# Patient Record
Sex: FEMALE | Race: WHITE | NOT HISPANIC OR LATINO | Employment: OTHER | URBAN - METROPOLITAN AREA
[De-identification: names, ages, dates, MRNs, and addresses within clinical notes are randomized per-mention and may not be internally consistent; named-entity substitution may affect disease eponyms.]

---

## 2023-02-27 LAB — THYROTROPIN (MIU/L) IN SER/PLAS BY DETECTION LIMIT <= 0.05 MIU/L: 2.62 MIU/L (ref 0.44–3.98)

## 2023-05-10 ENCOUNTER — OFFICE VISIT (OUTPATIENT)
Dept: PRIMARY CARE | Facility: CLINIC | Age: 68
End: 2023-05-10
Payer: MEDICARE

## 2023-05-10 VITALS
SYSTOLIC BLOOD PRESSURE: 131 MMHG | TEMPERATURE: 97.3 F | RESPIRATION RATE: 18 BRPM | BODY MASS INDEX: 28.63 KG/M2 | HEIGHT: 59 IN | HEART RATE: 68 BPM | DIASTOLIC BLOOD PRESSURE: 83 MMHG | WEIGHT: 142 LBS | OXYGEN SATURATION: 97 %

## 2023-05-10 DIAGNOSIS — J01.10 ACUTE NON-RECURRENT FRONTAL SINUSITIS: Primary | ICD-10-CM

## 2023-05-10 PROCEDURE — 99213 OFFICE O/P EST LOW 20 MIN: CPT | Performed by: NURSE PRACTITIONER

## 2023-05-10 ASSESSMENT — ENCOUNTER SYMPTOMS
SHORTNESS OF BREATH: 0
NAUSEA: 0
SORE THROAT: 0
RHINORRHEA: 1
VOMITING: 0
CHILLS: 0
ABDOMINAL PAIN: 0
SINUS COMPLAINT: 1
COUGH: 0
SINUS PAIN: 0
FEVER: 0
FATIGUE: 0
SINUS PRESSURE: 1
DIARRHEA: 0

## 2023-05-10 NOTE — PROGRESS NOTES
"Subjective   Chief Complaint: Sinus Problem.    Sinus Problem  Associated symptoms include congestion and sinus pressure. Pertinent negatives include no chills, coughing, ear pain, shortness of breath or sore throat.      Maritza Young is a 67 y.o. female who presents for Sinus Problem.  Patient presents with nasal congestion, sinus congestion, rhiorrhea, cheek pain, post nasal drip. Patient denies fever, chills, nausea, vomiting, diarrhea, chest pain, heart palpations, or shortness of breath. Her symptoms started 4/29/2023    OTC has been using dayqul and nyquil     Review of Systems   Constitutional:  Negative for chills, fatigue and fever.   HENT:  Positive for congestion, rhinorrhea and sinus pressure. Negative for ear discharge, ear pain, mouth sores, sinus pain and sore throat.    Respiratory:  Negative for cough and shortness of breath.    Cardiovascular:  Negative for chest pain.   Gastrointestinal:  Negative for abdominal pain, diarrhea, nausea and vomiting.       Objective   /83   Pulse 68   Temp 36.3 °C (97.3 °F)   Resp 18   Ht 1.499 m (4' 11\")   Wt 64.4 kg (142 lb)   SpO2 97%   BMI 28.68 kg/m²   BSA Body surface area is 1.64 meters squared.      Physical Exam  Constitutional:       Appearance: Normal appearance.   Cardiovascular:      Rate and Rhythm: Normal rate and regular rhythm.   Pulmonary:      Effort: Pulmonary effort is normal.      Breath sounds: Normal breath sounds.   Abdominal:      General: Abdomen is flat.      Palpations: Abdomen is soft.   Neurological:      Mental Status: She is alert.       Orders Only on 02/27/2023   Component Date Value Ref Range Status    TSH 02/27/2023 2.62  0.44 - 3.98 mIU/L Final    Comment:  TSH testing is performed using different testing    methodology at [facility] than at other    [facility]. Direct result comparisons should    only be made within the same method.     [facility] Encounter on 09/06/2022   Component Date Value Ref " Range Status    TSH 09/06/2022 2.82  0.44 - 3.98 mIU/L Final    Comment:  TSH testing is performed using different testing    methodology at [facility] than at other    system [facility]. Direct result comparisons should    only be made within the same method.     [facility] Encounter on 05/12/2022   Component Date Value Ref Range Status    TSH 05/12/2022 4.17 (H)  0.44 - 3.98 mIU/L Final    Comment:  TSH testing is performed using different testing    methodology at [facility] than at other    [facility]. Direct result comparisons should    only be made within the same method.       No current outpatient medications on file prior to visit.     No current facility-administered medications on file prior to visit.     No images are attached to the encounter.            Assessment/Plan   Problem List Items Addressed This Visit          Infectious/Inflammatory    Acute non-recurrent frontal sinusitis - Primary     Antibiotic sent to pharmacy  Advised patient to push fluids and start use of cool mist humidifier  Patient to start using flonase nasal spray  Patient to call if develop new or worsening symptoms

## 2023-05-10 NOTE — PATIENT INSTRUCTIONS
Antibiotic sent to pharmacy  Advised patient to push fluids and start use of cool mist humidifier  Patient to start using flonase nasal spray  Patient to call if develop new or worsening symptoms

## 2023-05-21 DIAGNOSIS — E03.9 HYPOTHYROIDISM, UNSPECIFIED: ICD-10-CM

## 2023-05-22 RX ORDER — LEVOTHYROXINE SODIUM 50 UG/1
TABLET ORAL
Qty: 135 TABLET | Refills: 1 | Status: SHIPPED | OUTPATIENT
Start: 2023-05-22 | End: 2023-11-24

## 2023-06-01 DIAGNOSIS — J30.89 ALLERGIC RHINITIS DUE TO OTHER ALLERGIC TRIGGER, UNSPECIFIED SEASONALITY: Primary | ICD-10-CM

## 2023-06-01 RX ORDER — FLUTICASONE PROPIONATE 50 MCG
SPRAY, SUSPENSION (ML) NASAL
Qty: 16 ML | Refills: 3 | Status: SHIPPED | OUTPATIENT
Start: 2023-06-01 | End: 2023-12-01 | Stop reason: ALTCHOICE

## 2023-07-25 ENCOUNTER — OFFICE VISIT (OUTPATIENT)
Dept: PRIMARY CARE | Facility: CLINIC | Age: 68
End: 2023-07-25
Payer: MEDICARE

## 2023-07-25 ENCOUNTER — TELEPHONE (OUTPATIENT)
Dept: PRIMARY CARE | Facility: CLINIC | Age: 68
End: 2023-07-25

## 2023-07-25 VITALS
SYSTOLIC BLOOD PRESSURE: 130 MMHG | OXYGEN SATURATION: 94 % | DIASTOLIC BLOOD PRESSURE: 83 MMHG | BODY MASS INDEX: 28.73 KG/M2 | WEIGHT: 142.25 LBS | HEART RATE: 59 BPM

## 2023-07-25 DIAGNOSIS — E78.1 ESSENTIAL HYPERTRIGLYCERIDEMIA: ICD-10-CM

## 2023-07-25 DIAGNOSIS — M89.8X2 PAIN OF RIGHT HUMERUS: Primary | ICD-10-CM

## 2023-07-25 DIAGNOSIS — E03.9 HYPOTHYROIDISM, UNSPECIFIED TYPE: ICD-10-CM

## 2023-07-25 DIAGNOSIS — M25.512 ACUTE PAIN OF LEFT SHOULDER: ICD-10-CM

## 2023-07-25 PROBLEM — L30.9 ECZEMA: Status: ACTIVE | Noted: 2023-07-25

## 2023-07-25 PROBLEM — D17.30 LIPOMA OF SKIN AND SUBCUTANEOUS TISSUE: Status: ACTIVE | Noted: 2023-07-25

## 2023-07-25 PROBLEM — I45.10 RBBB: Status: ACTIVE | Noted: 2023-07-25

## 2023-07-25 PROBLEM — R22.2 MASS OF CHEST WALL, RIGHT: Status: RESOLVED | Noted: 2018-03-26 | Resolved: 2023-07-25

## 2023-07-25 PROBLEM — R68.82 LIBIDO, DECREASED: Status: ACTIVE | Noted: 2023-07-25

## 2023-07-25 PROBLEM — R91.8 LUNG NODULES: Status: ACTIVE | Noted: 2023-07-25

## 2023-07-25 PROBLEM — R06.09 DYSPNEA ON EXERTION: Status: RESOLVED | Noted: 2023-07-25 | Resolved: 2023-07-25

## 2023-07-25 PROBLEM — R92.8 ABNORMAL MAMMOGRAM: Status: ACTIVE | Noted: 2023-07-25

## 2023-07-25 PROBLEM — B34.9 VIRAL ILLNESS: Status: RESOLVED | Noted: 2023-07-25 | Resolved: 2023-07-25

## 2023-07-25 PROBLEM — R53.83 FATIGUE: Status: ACTIVE | Noted: 2023-07-25

## 2023-07-25 PROBLEM — H10.30 ACUTE CONJUNCTIVITIS: Status: RESOLVED | Noted: 2023-07-25 | Resolved: 2023-07-25

## 2023-07-25 PROCEDURE — 99213 OFFICE O/P EST LOW 20 MIN: CPT | Performed by: NURSE PRACTITIONER

## 2023-07-25 PROCEDURE — 1159F MED LIST DOCD IN RCRD: CPT | Performed by: NURSE PRACTITIONER

## 2023-07-25 PROCEDURE — 1036F TOBACCO NON-USER: CPT | Performed by: NURSE PRACTITIONER

## 2023-07-25 PROCEDURE — 1160F RVW MEDS BY RX/DR IN RCRD: CPT | Performed by: NURSE PRACTITIONER

## 2023-07-25 RX ORDER — VIT A/VIT C/VIT E/ZINC/COPPER 2148-113
1 TABLET ORAL DAILY
COMMUNITY
Start: 2022-07-19

## 2023-07-25 ASSESSMENT — ENCOUNTER SYMPTOMS
DIARRHEA: 0
FEVER: 0
SHORTNESS OF BREATH: 0
ABDOMINAL PAIN: 0
COUGH: 0
NAUSEA: 0
VOMITING: 0
CHILLS: 0

## 2023-07-25 NOTE — PATIENT INSTRUCTIONS
Obtain xray as ordered  We will call you with the results  Recommend ICE 20 minutes twice daily  May use tylenol and/or advil for pain

## 2023-07-25 NOTE — PROGRESS NOTES
Subjective   Chief Complaint: Tendonitis (Since 7/19. Right bicep).    THEA Young is a 67 y.o. female who presents for Tendonitis (Since 7/19. Right bicep).    Patient reports that 6 days ago she was lifting a heavy basket to water and she heard pop in her right arm which caused her to drop the basket onto the ground. She reports that the basket did not fall on her arm but she did develop a right arm bruise. She has been applying ice to the area but continues to experience some ROM issues when moving her hand behind her back.    Patient is also inquiring about a pain in her left shoulder that started a few months ago and has not improved. She has difficulty rasing her left arm all the way above her head due to the pain.   She has no swelling or bruising of her left arm or shoulder. She has not had any xray done of this area.     Patient denies fever, chills, nausea, vomiting, diarrhea, chest pain, heart palpations, or shortness of breath.       Review of Systems   Constitutional:  Negative for chills and fever.   Respiratory:  Negative for cough and shortness of breath.    Cardiovascular:  Negative for chest pain.   Gastrointestinal:  Negative for abdominal pain, diarrhea, nausea and vomiting.   Musculoskeletal:         Left shoulder pain with ROM    Right upper arm pain and bruising        Objective   /83   Pulse 59   Wt 64.5 kg (142 lb 4 oz)   SpO2 94%   BMI 28.73 kg/m²   BSA Body surface area is 1.64 meters squared.      Physical Exam  Constitutional:       Appearance: Normal appearance.   Cardiovascular:      Rate and Rhythm: Normal rate and regular rhythm.   Pulmonary:      Effort: Pulmonary effort is normal.      Breath sounds: Normal breath sounds.   Abdominal:      General: Abdomen is flat.      Palpations: Abdomen is soft.   Musculoskeletal:      Comments: Right upper arm bruising, swelling and tenderness    Left shoulder decreased ROM    Neurological:      Mental Status: She is alert.        Orders Only on 02/27/2023   Component Date Value Ref Range Status    TSH 02/27/2023 2.62  0.44 - 3.98 mIU/L Final    Comment:  TSH testing is performed using different testing    methodology at [facility] than at other    [facility]. Direct result comparisons should    only be made within the same method.     [facility] Encounter on 09/06/2022   Component Date Value Ref Range Status    TSH 09/06/2022 2.82  0.44 - 3.98 mIU/L Final    Comment:  TSH testing is performed using different testing    methodology at [facility] than at other    [facility]. Direct result comparisons should    only be made within the same method.       Current Outpatient Medications on File Prior to Visit   Medication Sig Dispense Refill    calcium citrate-vitamin D2 250 mg-2.5 mcg (100 unit) tablet Take 250 mg by mouth once daily.      flaxseed oiL oil Take 1,000 mg by mouth once daily.      levothyroxine (Synthroid, Levoxyl) 50 mcg tablet TAKE 1 & 1/2 TABLET BY MOUTH EVERY  tablet 1    vitamins A,C,E-zinc-copper (PreserVision AREDS) 2,148 mcg-113 mg-45 mg-17.4mg tablet Take 1 tablet by mouth once daily.      fluticasone (Flonase) 50 mcg/actuation nasal spray USE 2 SPRAYS IN EACH NOSTRIL DAILY (Patient not taking: Reported on 7/25/2023) 16 mL 3     No current facility-administered medications on file prior to visit.     No images are attached to the encounter.            Assessment/Plan   Problem List Items Addressed This Visit       Pain of right humerus - Primary     XR ordered          Relevant Orders    XR humerus right     Other Visit Diagnoses       Acute pain of left shoulder        Relevant Orders    XR shoulder left 2+ views

## 2023-10-10 ENCOUNTER — APPOINTMENT (OUTPATIENT)
Dept: PRIMARY CARE | Facility: CLINIC | Age: 68
End: 2023-10-10
Payer: MEDICARE

## 2023-11-07 ENCOUNTER — APPOINTMENT (OUTPATIENT)
Dept: PRIMARY CARE | Facility: CLINIC | Age: 68
End: 2023-11-07
Payer: MEDICARE

## 2023-11-21 ENCOUNTER — LAB (OUTPATIENT)
Dept: LAB | Facility: LAB | Age: 68
End: 2023-11-21
Payer: MEDICARE

## 2023-11-21 DIAGNOSIS — E03.9 HYPOTHYROIDISM, UNSPECIFIED TYPE: ICD-10-CM

## 2023-11-21 DIAGNOSIS — E78.1 ESSENTIAL HYPERTRIGLYCERIDEMIA: ICD-10-CM

## 2023-11-21 LAB
BASOPHILS # BLD AUTO: 0.03 X10*3/UL (ref 0–0.1)
BASOPHILS NFR BLD AUTO: 0.7 %
EOSINOPHIL # BLD AUTO: 0.1 X10*3/UL (ref 0–0.7)
EOSINOPHIL NFR BLD AUTO: 2.2 %
ERYTHROCYTE [DISTWIDTH] IN BLOOD BY AUTOMATED COUNT: 13 % (ref 11.5–14.5)
HCT VFR BLD AUTO: 42.1 % (ref 36–46)
HGB BLD-MCNC: 14.2 G/DL (ref 12–16)
IMM GRANULOCYTES # BLD AUTO: 0 X10*3/UL (ref 0–0.7)
IMM GRANULOCYTES NFR BLD AUTO: 0 % (ref 0–0.9)
LYMPHOCYTES # BLD AUTO: 1.73 X10*3/UL (ref 1.2–4.8)
LYMPHOCYTES NFR BLD AUTO: 38.1 %
MCH RBC QN AUTO: 30.8 PG (ref 26–34)
MCHC RBC AUTO-ENTMCNC: 33.7 G/DL (ref 32–36)
MCV RBC AUTO: 91 FL (ref 80–100)
MONOCYTES # BLD AUTO: 0.33 X10*3/UL (ref 0.1–1)
MONOCYTES NFR BLD AUTO: 7.3 %
NEUTROPHILS # BLD AUTO: 2.35 X10*3/UL (ref 1.2–7.7)
NEUTROPHILS NFR BLD AUTO: 51.7 %
NRBC BLD-RTO: 0 /100 WBCS (ref 0–0)
PLATELET # BLD AUTO: 201 X10*3/UL (ref 150–450)
RBC # BLD AUTO: 4.61 X10*6/UL (ref 4–5.2)
WBC # BLD AUTO: 4.5 X10*3/UL (ref 4.4–11.3)

## 2023-11-21 PROCEDURE — 84443 ASSAY THYROID STIM HORMONE: CPT

## 2023-11-21 PROCEDURE — 36415 COLL VENOUS BLD VENIPUNCTURE: CPT

## 2023-11-21 PROCEDURE — 80053 COMPREHEN METABOLIC PANEL: CPT

## 2023-11-21 PROCEDURE — 80061 LIPID PANEL: CPT

## 2023-11-21 PROCEDURE — 85025 COMPLETE CBC W/AUTO DIFF WBC: CPT

## 2023-11-22 LAB
ALBUMIN SERPL BCP-MCNC: 4.4 G/DL (ref 3.4–5)
ALP SERPL-CCNC: 79 U/L (ref 33–136)
ALT SERPL W P-5'-P-CCNC: 16 U/L (ref 7–45)
ANION GAP SERPL CALC-SCNC: 11 MMOL/L (ref 10–20)
AST SERPL W P-5'-P-CCNC: 18 U/L (ref 9–39)
BILIRUB SERPL-MCNC: 0.5 MG/DL (ref 0–1.2)
BUN SERPL-MCNC: 19 MG/DL (ref 6–23)
CALCIUM SERPL-MCNC: 9.3 MG/DL (ref 8.6–10.6)
CHLORIDE SERPL-SCNC: 104 MMOL/L (ref 98–107)
CHOLEST SERPL-MCNC: 175 MG/DL (ref 0–199)
CHOLESTEROL/HDL RATIO: 4.2
CO2 SERPL-SCNC: 30 MMOL/L (ref 21–32)
CREAT SERPL-MCNC: 0.7 MG/DL (ref 0.5–1.05)
GFR SERPL CREATININE-BSD FRML MDRD: >90 ML/MIN/1.73M*2
GLUCOSE SERPL-MCNC: 79 MG/DL (ref 74–99)
HDLC SERPL-MCNC: 41.4 MG/DL
LDLC SERPL CALC-MCNC: 88 MG/DL
NON HDL CHOLESTEROL: 134 MG/DL (ref 0–149)
POTASSIUM SERPL-SCNC: 4.1 MMOL/L (ref 3.5–5.3)
PROT SERPL-MCNC: 6.6 G/DL (ref 6.4–8.2)
SODIUM SERPL-SCNC: 141 MMOL/L (ref 136–145)
TRIGL SERPL-MCNC: 228 MG/DL (ref 0–149)
TSH SERPL-ACNC: 2.2 MIU/L (ref 0.44–3.98)
VLDL: 46 MG/DL (ref 0–40)

## 2023-11-23 DIAGNOSIS — E03.9 HYPOTHYROIDISM, UNSPECIFIED: ICD-10-CM

## 2023-11-24 RX ORDER — LEVOTHYROXINE SODIUM 50 UG/1
TABLET ORAL
Qty: 135 TABLET | Refills: 1 | Status: SHIPPED | OUTPATIENT
Start: 2023-11-24 | End: 2024-05-13

## 2023-12-01 ENCOUNTER — OFFICE VISIT (OUTPATIENT)
Dept: PRIMARY CARE | Facility: CLINIC | Age: 68
End: 2023-12-01
Payer: MEDICARE

## 2023-12-01 VITALS
HEIGHT: 59 IN | WEIGHT: 138.6 LBS | DIASTOLIC BLOOD PRESSURE: 70 MMHG | HEART RATE: 68 BPM | SYSTOLIC BLOOD PRESSURE: 132 MMHG | BODY MASS INDEX: 27.94 KG/M2

## 2023-12-01 DIAGNOSIS — E03.9 HYPOTHYROIDISM, UNSPECIFIED TYPE: ICD-10-CM

## 2023-12-01 DIAGNOSIS — I45.10 RBBB: ICD-10-CM

## 2023-12-01 DIAGNOSIS — Z85.3 HISTORY OF LEFT BREAST CANCER: ICD-10-CM

## 2023-12-01 DIAGNOSIS — Z00.00 ROUTINE GENERAL MEDICAL EXAMINATION AT HEALTH CARE FACILITY: Primary | ICD-10-CM

## 2023-12-01 DIAGNOSIS — Z71.89 CARDIAC RISK COUNSELING: ICD-10-CM

## 2023-12-01 DIAGNOSIS — Z28.21 REFUSED PNEUMOCOCCAL VACCINE: ICD-10-CM

## 2023-12-01 DIAGNOSIS — L91.8 SKIN TAG: ICD-10-CM

## 2023-12-01 DIAGNOSIS — R91.8 LUNG NODULES: ICD-10-CM

## 2023-12-01 DIAGNOSIS — M89.8X2 PAIN OF RIGHT HUMERUS: ICD-10-CM

## 2023-12-01 DIAGNOSIS — Z00.00 ENCOUNTER FOR ANNUAL WELLNESS VISIT (AWV) IN MEDICARE PATIENT: ICD-10-CM

## 2023-12-01 DIAGNOSIS — M25.512 ACUTE PAIN OF LEFT SHOULDER: ICD-10-CM

## 2023-12-01 DIAGNOSIS — Z71.89 ADVANCE DIRECTIVE DISCUSSED WITH PATIENT: ICD-10-CM

## 2023-12-01 PROBLEM — R53.83 FATIGUE: Status: RESOLVED | Noted: 2023-07-25 | Resolved: 2023-12-01

## 2023-12-01 PROBLEM — R92.8 ABNORMAL MAMMOGRAM: Status: RESOLVED | Noted: 2023-07-25 | Resolved: 2023-12-01

## 2023-12-01 PROBLEM — E78.1 ESSENTIAL HYPERTRIGLYCERIDEMIA: Status: RESOLVED | Noted: 2023-07-25 | Resolved: 2023-12-01

## 2023-12-01 PROBLEM — D17.30 LIPOMA OF SKIN AND SUBCUTANEOUS TISSUE: Status: RESOLVED | Noted: 2023-07-25 | Resolved: 2023-12-01

## 2023-12-01 PROBLEM — J01.10 ACUTE NON-RECURRENT FRONTAL SINUSITIS: Status: RESOLVED | Noted: 2023-05-10 | Resolved: 2023-12-01

## 2023-12-01 PROCEDURE — 93000 ELECTROCARDIOGRAM COMPLETE: CPT | Performed by: FAMILY MEDICINE

## 2023-12-01 PROCEDURE — G0446 INTENS BEHAVE THER CARDIO DX: HCPCS | Performed by: FAMILY MEDICINE

## 2023-12-01 PROCEDURE — 1036F TOBACCO NON-USER: CPT | Performed by: FAMILY MEDICINE

## 2023-12-01 PROCEDURE — 99214 OFFICE O/P EST MOD 30 MIN: CPT | Performed by: FAMILY MEDICINE

## 2023-12-01 PROCEDURE — 1170F FXNL STATUS ASSESSED: CPT | Performed by: FAMILY MEDICINE

## 2023-12-01 PROCEDURE — 1159F MED LIST DOCD IN RCRD: CPT | Performed by: FAMILY MEDICINE

## 2023-12-01 PROCEDURE — 99497 ADVNCD CARE PLAN 30 MIN: CPT | Performed by: FAMILY MEDICINE

## 2023-12-01 PROCEDURE — 1160F RVW MEDS BY RX/DR IN RCRD: CPT | Performed by: FAMILY MEDICINE

## 2023-12-01 PROCEDURE — G0439 PPPS, SUBSEQ VISIT: HCPCS | Performed by: FAMILY MEDICINE

## 2023-12-01 ASSESSMENT — ENCOUNTER SYMPTOMS
CONSTIPATION: 0
NAUSEA: 0
LOSS OF SENSATION IN FEET: 0
SINUS PAIN: 0
CHEST TIGHTNESS: 0
SEIZURES: 0
PHOTOPHOBIA: 0
ADENOPATHY: 0
NUMBNESS: 0
BLOOD IN STOOL: 0
FREQUENCY: 0
ARTHRALGIAS: 1
POLYDIPSIA: 0
SINUS PRESSURE: 0
DIFFICULTY URINATING: 0
NECK PAIN: 0
APNEA: 0
ABDOMINAL PAIN: 0
FACIAL SWELLING: 0
NERVOUS/ANXIOUS: 0
RHINORRHEA: 0
FATIGUE: 0
CHILLS: 0
ROS SKIN COMMENTS: SKIN TAG
HEADACHES: 0
FACIAL ASYMMETRY: 0
TREMORS: 0
EYE PAIN: 0
VOMITING: 0
PALPITATIONS: 0
BACK PAIN: 0
FEVER: 0
ACTIVITY CHANGE: 0
DIARRHEA: 0
DIAPHORESIS: 0
MYALGIAS: 0
BRUISES/BLEEDS EASILY: 0
OCCASIONAL FEELINGS OF UNSTEADINESS: 0
CONFUSION: 0
APPETITE CHANGE: 0
SORE THROAT: 0
AGITATION: 0
EYE DISCHARGE: 0
COUGH: 0
SHORTNESS OF BREATH: 0
TROUBLE SWALLOWING: 0
SLEEP DISTURBANCE: 0
DEPRESSION: 0
COLOR CHANGE: 0
STRIDOR: 0
EYE REDNESS: 0
RESPIRATORY NEGATIVE: 1
JOINT SWELLING: 0
ABDOMINAL DISTENTION: 0
DYSPHORIC MOOD: 0
HALLUCINATIONS: 0
EYE ITCHING: 0
DIZZINESS: 0

## 2023-12-01 ASSESSMENT — PATIENT HEALTH QUESTIONNAIRE - PHQ9
2. FEELING DOWN, DEPRESSED OR HOPELESS: NOT AT ALL
1. LITTLE INTEREST OR PLEASURE IN DOING THINGS: NOT AT ALL
SUM OF ALL RESPONSES TO PHQ9 QUESTIONS 1 AND 2: 0

## 2023-12-01 ASSESSMENT — ACTIVITIES OF DAILY LIVING (ADL)
MANAGING_FINANCES: INDEPENDENT
DRESSING: INDEPENDENT
DOING_HOUSEWORK: INDEPENDENT
BATHING: INDEPENDENT
TAKING_MEDICATION: INDEPENDENT
GROCERY_SHOPPING: INDEPENDENT

## 2023-12-01 NOTE — PATIENT INSTRUCTIONS
Going to have CT of the lung for follow-up on lung nodularity performed.    EKG performed and reviewed.    Labs look good and are at goal.    Please continue to exercise on a regular basis.    Recommend doing physical therapy for shoulder pain going to have you do it twice weekly for 3 weeks.  If not improving please let me know.    EKG performed and reviewed.    Recommend pneumonia vaccination.  Recommend shingles vaccination.

## 2023-12-01 NOTE — PROGRESS NOTES
Advance Care Planning Note   Some shoulder pail.  Better now.  Hurts int rotation.  Patient had some pain some discomfort in the area of the left shoulder.    Patient had some troubles with range of motion of shoulder.  She had an injury where her shoulder had a pale.    Patient had    Pain with range of motion of the shoulder with abduction with internal and external rotation    Having some patient had no troubles with headache or double vision or blurry vision no chest pain or shortness of breath or diaphoresis no abdominal pain or discomfort.  No breast lumps tenderness masses or nipple discharge.  She did see the breast health specialist this year.    No swelling of the legs or feet    R hand Numbness    Colonoscopy 2023  UTD Mamogram.  Discussion Date: 12/01/23   Discussion Participants: patient  Excesise 2-3 miles  .Dentist not UTD  Eyes UTD  The patient wishes to discuss Advance Care Planning today and the following is a brief summary of our discussion.     Patient has capacity to make their own medical decisions: Yes  Health Care Agent/Surrogate Decision Maker documented in chart: No    Documents on file and valid:  Advance Directive/Living Will: No   Health Care Power of : No  Other: no DNR    Communication of Medical Status/Prognosis:   good     Communication of Treatment Goals/Options:   good     Treatment Decisions  good    Time Statement: Total face to face time spent on advance care planning was 16 minutes with 16 minutes spent in counseling, including the explanation.    Abdirizak Khan, DO    Review of Systems   Constitutional:  Negative for activity change, appetite change, chills, diaphoresis, fatigue and fever.   HENT:  Negative for congestion, dental problem, drooling, ear discharge, facial swelling, nosebleeds, rhinorrhea, sinus pressure, sinus pain, sneezing, sore throat, tinnitus and trouble swallowing.    Eyes:  Negative for photophobia, pain, discharge, redness, itching and visual  "disturbance.        Eyes UTD   Respiratory: Negative.  Negative for apnea, cough, chest tightness, shortness of breath and stridor.    Cardiovascular:  Negative for chest pain, palpitations and leg swelling.   Gastrointestinal:  Negative for abdominal distention, abdominal pain, blood in stool, constipation, diarrhea, nausea and vomiting.   Endocrine: Negative for cold intolerance, heat intolerance, polydipsia and polyuria.   Genitourinary:  Negative for difficulty urinating, enuresis and frequency.   Musculoskeletal:  Positive for arthralgias. Negative for back pain, joint swelling, myalgias and neck pain.        L shoulder pain   Skin:  Negative for color change and rash.        Skin tag   Allergic/Immunologic: Negative for environmental allergies and food allergies.   Neurological:  Negative for dizziness, tremors, seizures, syncope, facial asymmetry, numbness and headaches.   Hematological:  Negative for adenopathy. Does not bruise/bleed easily.   Psychiatric/Behavioral:  Negative for agitation, behavioral problems, confusion, dysphoric mood, hallucinations, sleep disturbance and suicidal ideas. The patient is not nervous/anxious.        Objective   Vitals:  /70 (BP Location: Left arm)   Pulse 68   Ht 1.499 m (4' 11\")   Wt 62.9 kg (138 lb 9.6 oz)   BMI 27.99 kg/m²       Physical Exam  Constitutional:       General: She is not in acute distress.     Appearance: Normal appearance. She is not ill-appearing or diaphoretic.   HENT:      Head: Normocephalic.      Right Ear: Tympanic membrane, ear canal and external ear normal. There is no impacted cerumen.      Left Ear: Tympanic membrane, ear canal and external ear normal. There is no impacted cerumen.      Nose: No congestion or rhinorrhea.      Mouth/Throat:      Pharynx: No oropharyngeal exudate or posterior oropharyngeal erythema.   Eyes:      Conjunctiva/sclera: Conjunctivae normal.      Pupils: Pupils are equal, round, and reactive to light.   Neck: "      Vascular: No carotid bruit.   Cardiovascular:      Rate and Rhythm: Normal rate and regular rhythm.      Pulses: Normal pulses.      Heart sounds: No murmur heard.     No friction rub.   Pulmonary:      Effort: No respiratory distress.      Breath sounds: No stridor.   Abdominal:      General: There is no distension.      Tenderness: There is no abdominal tenderness. There is no guarding.   Musculoskeletal:         General: No swelling or tenderness.      Right lower leg: No edema.      Left lower leg: No edema.   Lymphadenopathy:      Cervical: No cervical adenopathy.   Skin:     General: Skin is warm and dry.      Coloration: Skin is not pale.      Findings: No rash.      Comments: Skin tag on left shoulder.  Cryotherapy applied x 3   Neurological:      Cranial Nerves: No cranial nerve deficit.      Sensory: No sensory deficit.      Motor: No weakness.      Coordination: Coordination normal.   Psychiatric:         Mood and Affect: Mood normal.         Behavior: Behavior normal.         Judgment: Judgment normal.         Assessment/Plan   Problem List Items Addressed This Visit       History of left breast cancer    Current Assessment & Plan     Has been followed up by breast health specialist.  Mammogram up-to-date         Hypothyroidism    Current Assessment & Plan     Thyroid level looks good and is at goal         Lung nodules    Current Assessment & Plan     Evaluating lung nodularity.  CT of the chest going to be performed for follow-up         RBBB    Pain of right humerus    Current Assessment & Plan     Going to have you do physical therapy twice weekly for 3 weeks         Encounter for annual wellness visit (AWV) in Medicare patient    Skin tag    Current Assessment & Plan     Cryotherapy applied x 3 to the area         Refused pneumococcal vaccine     Other Visit Diagnoses       Routine general medical examination at health care facility    -  Primary

## 2023-12-06 ENCOUNTER — ANCILLARY PROCEDURE (OUTPATIENT)
Dept: RADIOLOGY | Facility: CLINIC | Age: 68
End: 2023-12-06
Payer: MEDICARE

## 2023-12-06 DIAGNOSIS — R91.8 LUNG NODULES: ICD-10-CM

## 2023-12-06 PROCEDURE — 71250 CT THORAX DX C-: CPT | Performed by: RADIOLOGY

## 2023-12-06 PROCEDURE — 71250 CT THORAX DX C-: CPT

## 2024-02-20 ENCOUNTER — OFFICE VISIT (OUTPATIENT)
Dept: PRIMARY CARE | Facility: CLINIC | Age: 69
End: 2024-02-20
Payer: MEDICARE

## 2024-02-20 VITALS
OXYGEN SATURATION: 94 % | WEIGHT: 136 LBS | TEMPERATURE: 99.6 F | DIASTOLIC BLOOD PRESSURE: 73 MMHG | HEART RATE: 55 BPM | SYSTOLIC BLOOD PRESSURE: 149 MMHG | BODY MASS INDEX: 27.47 KG/M2 | RESPIRATION RATE: 16 BRPM

## 2024-02-20 DIAGNOSIS — R05.1 ACUTE COUGH: ICD-10-CM

## 2024-02-20 DIAGNOSIS — J01.10 ACUTE NON-RECURRENT FRONTAL SINUSITIS: Primary | ICD-10-CM

## 2024-02-20 PROCEDURE — 1159F MED LIST DOCD IN RCRD: CPT | Performed by: NURSE PRACTITIONER

## 2024-02-20 PROCEDURE — 1160F RVW MEDS BY RX/DR IN RCRD: CPT | Performed by: NURSE PRACTITIONER

## 2024-02-20 PROCEDURE — 1036F TOBACCO NON-USER: CPT | Performed by: NURSE PRACTITIONER

## 2024-02-20 PROCEDURE — 99213 OFFICE O/P EST LOW 20 MIN: CPT | Performed by: NURSE PRACTITIONER

## 2024-02-20 RX ORDER — BENZONATATE 200 MG/1
200 CAPSULE ORAL 3 TIMES DAILY PRN
Qty: 42 CAPSULE | Refills: 0 | Status: SHIPPED | OUTPATIENT
Start: 2024-02-20 | End: 2024-03-06 | Stop reason: ALTCHOICE

## 2024-02-20 RX ORDER — FLUTICASONE PROPIONATE 50 MCG
2 SPRAY, SUSPENSION (ML) NASAL DAILY
Qty: 16 G | Refills: 1 | Status: SHIPPED | OUTPATIENT
Start: 2024-02-20 | End: 2024-03-13

## 2024-02-20 RX ORDER — AMOXICILLIN AND CLAVULANATE POTASSIUM 875; 125 MG/1; MG/1
875 TABLET, FILM COATED ORAL 2 TIMES DAILY
Qty: 20 TABLET | Refills: 0 | Status: SHIPPED | OUTPATIENT
Start: 2024-02-20 | End: 2024-03-06 | Stop reason: ALTCHOICE

## 2024-02-20 ASSESSMENT — ENCOUNTER SYMPTOMS
SORE THROAT: 0
WHEEZING: 0
LOSS OF SENSATION IN FEET: 0
COUGH: 1
DIARRHEA: 0
FEVER: 0
SHORTNESS OF BREATH: 0
OCCASIONAL FEELINGS OF UNSTEADINESS: 0
RHINORRHEA: 1
VOMITING: 0
ABDOMINAL PAIN: 0
CHILLS: 0
NAUSEA: 0

## 2024-02-20 ASSESSMENT — PATIENT HEALTH QUESTIONNAIRE - PHQ9
10. IF YOU CHECKED OFF ANY PROBLEMS, HOW DIFFICULT HAVE THESE PROBLEMS MADE IT FOR YOU TO DO YOUR WORK, TAKE CARE OF THINGS AT HOME, OR GET ALONG WITH OTHER PEOPLE: NOT DIFFICULT AT ALL
SUM OF ALL RESPONSES TO PHQ9 QUESTIONS 1 AND 2: 0
1. LITTLE INTEREST OR PLEASURE IN DOING THINGS: NOT AT ALL
2. FEELING DOWN, DEPRESSED OR HOPELESS: NOT AT ALL

## 2024-02-20 NOTE — PROGRESS NOTES
Subjective   Chief Complaint: Sinusitis (Congestion,  headache x 1 week).    HPI   Maritza Young is a 68 y.o. female who presents for Sinusitis (Congestion,  headache x 1 week).    She reports sinus pressure, congestion, post nasal drip, and cough for 1 week     Patient denies fever, chills, nausea, vomiting, diarrhea, chest pain, heart palpations, or shortness of breath.     OTC has been using nyquil and dayquil       At home COVID test neg    Review of Systems   Constitutional:  Negative for chills and fever.   HENT:  Positive for congestion, postnasal drip and rhinorrhea. Negative for sore throat.    Respiratory:  Positive for cough. Negative for shortness of breath and wheezing.    Cardiovascular:  Negative for chest pain.   Gastrointestinal:  Negative for abdominal pain, diarrhea, nausea and vomiting.       Objective   /73 (BP Location: Left arm, Patient Position: Sitting, BP Cuff Size: Adult)   Pulse 55   Temp 37.6 °C (99.6 °F)   Resp 16   Wt 61.7 kg (136 lb)   SpO2 94%   BMI 27.47 kg/m²   BSA Body surface area is 1.6 meters squared.      Physical Exam  Constitutional:       Appearance: Normal appearance.   HENT:      Right Ear: Tympanic membrane normal.      Left Ear: Tympanic membrane normal.      Nose: Congestion present.      Mouth/Throat:      Mouth: Mucous membranes are moist.   Cardiovascular:      Rate and Rhythm: Normal rate and regular rhythm.   Pulmonary:      Effort: Pulmonary effort is normal.      Breath sounds: Normal breath sounds.   Abdominal:      General: Abdomen is flat.      Palpations: Abdomen is soft.   Neurological:      Mental Status: She is alert.       Lab on 11/21/2023   Component Date Value Ref Range Status    WBC 11/21/2023 4.5  4.4 - 11.3 x10*3/uL Final    nRBC 11/21/2023 0.0  0.0 - 0.0 /100 WBCs Final    RBC 11/21/2023 4.61  4.00 - 5.20 x10*6/uL Final    Hemoglobin 11/21/2023 14.2  12.0 - 16.0 g/dL Final    Hematocrit 11/21/2023 42.1  36.0 - 46.0 % Final    MCV  11/21/2023 91  80 - 100 fL Final    MCH 11/21/2023 30.8  26.0 - 34.0 pg Final    MCHC 11/21/2023 33.7  32.0 - 36.0 g/dL Final    RDW 11/21/2023 13.0  11.5 - 14.5 % Final    Platelets 11/21/2023 201  150 - 450 x10*3/uL Final    Neutrophils % 11/21/2023 51.7  40.0 - 80.0 % Final    Immature Granulocytes %, Automated 11/21/2023 0.0  0.0 - 0.9 % Final    Immature Granulocyte Count (IG) includes promyelocytes, myelocytes and metamyelocytes but does not include bands. Percent differential counts (%) should be interpreted in the context of the absolute cell counts (cells/UL).    Lymphocytes % 11/21/2023 38.1  13.0 - 44.0 % Final    Monocytes % 11/21/2023 7.3  2.0 - 10.0 % Final    Eosinophils % 11/21/2023 2.2  0.0 - 6.0 % Final    Basophils % 11/21/2023 0.7  0.0 - 2.0 % Final    Neutrophils Absolute 11/21/2023 2.35  1.20 - 7.70 x10*3/uL Final    Percent differential counts (%) should be interpreted in the context of the absolute cell counts (cells/uL).    Immature Granulocytes Absolute, Au* 11/21/2023 0.00  0.00 - 0.70 x10*3/uL Final    Lymphocytes Absolute 11/21/2023 1.73  1.20 - 4.80 x10*3/uL Final    Monocytes Absolute 11/21/2023 0.33  0.10 - 1.00 x10*3/uL Final    Eosinophils Absolute 11/21/2023 0.10  0.00 - 0.70 x10*3/uL Final    Basophils Absolute 11/21/2023 0.03  0.00 - 0.10 x10*3/uL Final    Glucose 11/21/2023 79  74 - 99 mg/dL Final    Sodium 11/21/2023 141  136 - 145 mmol/L Final    Potassium 11/21/2023 4.1  3.5 - 5.3 mmol/L Final    Chloride 11/21/2023 104  98 - 107 mmol/L Final    Bicarbonate 11/21/2023 30  21 - 32 mmol/L Final    Anion Gap 11/21/2023 11  10 - 20 mmol/L Final    Urea Nitrogen 11/21/2023 19  6 - 23 mg/dL Final    Creatinine 11/21/2023 0.70  0.50 - 1.05 mg/dL Final    eGFR 11/21/2023 >90  >60 mL/min/1.73m*2 Final    Calculations of estimated GFR are performed using the 2021 CKD-EPI Study Refit equation without the race variable for the IDMS-Traceable creatinine  methods.  https://jasn.asnjournals.org/content/early/2021/09/22/ASN.4281968503    Calcium 11/21/2023 9.3  8.6 - 10.6 mg/dL Final    Albumin 11/21/2023 4.4  3.4 - 5.0 g/dL Final    Alkaline Phosphatase 11/21/2023 79  33 - 136 U/L Final    Total Protein 11/21/2023 6.6  6.4 - 8.2 g/dL Final    AST 11/21/2023 18  9 - 39 U/L Final    Bilirubin, Total 11/21/2023 0.5  0.0 - 1.2 mg/dL Final    ALT 11/21/2023 16  7 - 45 U/L Final    Patients treated with Sulfasalazine may generate falsely decreased results for ALT.    Cholesterol 11/21/2023 175  0 - 199 mg/dL Final          Age      Desirable   Borderline High   High     0-19 Y     0 - 169       170 - 199     >/= 200    20-24 Y     0 - 189       190 - 224     >/= 225         >24 Y     0 - 199       200 - 239     >/= 240   **All ranges are based on fasting samples. Specific   therapeutic targets will vary based on patient-specific   cardiac risk.    Pediatric guidelines reference:Pediatrics 2011, 128(S5).Adult guidelines reference: NCEP ATPIII Guidelines,NICOLE 2001, 258:2486-97    Venipuncture immediately after or during the administration of Metamizole may lead to falsely low results. Testing should be performed immediately prior to Metamizole dosing.    HDL-Cholesterol 11/21/2023 41.4  mg/dL Final      Age       Very Low   Low     Normal    High    0-19 Y    < 35      < 40     40-45     ----  20-24 Y    ----     < 40      >45      ----        >24 Y      ----     < 40     40-60      >60      Cholesterol/HDL Ratio 11/21/2023 4.2   Final      Ref Values  Desirable  < 3.4  High Risk  > 5.0    LDL Calculated 11/21/2023 88  <=99 mg/dL Final                                Near   Borderline      AGE      Desirable  Optimal    High     High     Very High     0-19 Y     0 - 109     ---    110-129   >/= 130     ----    20-24 Y     0 - 119     ---    120-159   >/= 160     ----      >24 Y     0 -  99   100-129  130-159   160-189     >/=190      VLDL 11/21/2023 46 (H)  0 - 40 mg/dL Final     Triglycerides 11/21/2023 228 (H)  0 - 149 mg/dL Final       Age         Desirable   Borderline High   High     Very High   0 D-90 D    19 - 174         ----         ----        ----  91 D- 9 Y     0 -  74        75 -  99     >/= 100      ----    10-19 Y     0 -  89        90 - 129     >/= 130      ----    20-24 Y     0 - 114       115 - 149     >/= 150      ----         >24 Y     0 - 149       150 - 199    200- 499    >/= 500    Venipuncture immediately after or during the administration of Metamizole may lead to falsely low results. Testing should be performed immediately prior to Metamizole dosing.    Non HDL Cholesterol 11/21/2023 134  0 - 149 mg/dL Final          Age       Desirable   Borderline High   High     Very High     0-19 Y     0 - 119       120 - 144     >/= 145    >/= 160    20-24 Y     0 - 149       150 - 189     >/= 190      ----         >24 Y    30 mg/dL above LDL Cholesterol goal      Thyroid Stimulating Hormone 11/21/2023 2.20  0.44 - 3.98 mIU/L Final   Orders Only on 02/27/2023   Component Date Value Ref Range Status    TSH 02/27/2023 2.62  0.44 - 3.98 mIU/L Final    Comment:  TSH testing is performed using different testing    methodology at [facility] than at other    [facility]. Direct result comparisons should    only be made within the same method.       Current Outpatient Medications on File Prior to Visit   Medication Sig Dispense Refill    calcium citrate-vitamin D2 250 mg-2.5 mcg (100 unit) tablet Take 250 mg by mouth once daily.      flaxseed oiL oil Take 1,000 mg by mouth once daily.      levothyroxine (Synthroid, Levoxyl) 50 mcg tablet TAKE 1 & 1/2 TABLETS BY MOUTH EVERY  tablet 1    vitamins A,C,E-zinc-copper (PreserVision AREDS) 2,148 mcg-113 mg-45 mg-17.4mg tablet Take 1 tablet by mouth once daily.       No current facility-administered medications on file prior to visit.     No images are attached to the encounter.            Assessment/Plan   Problem  List Items Addressed This Visit             ICD-10-CM    Acute non-recurrent frontal sinusitis - Primary J01.10     Antibiotic sent to pharmacy  Tessalon perles sent to pharmacy for cough   Advised patient to push fluids and start use of cool mist humidifier  Patient to start using flonase nasal spray  Patient to call if develop new or worsening symptoms          Relevant Medications    fluticasone (Flonase) 50 mcg/actuation nasal spray    amoxicillin-pot clavulanate (Augmentin) 875-125 mg tablet     Other Visit Diagnoses         Codes    Acute cough     R05.1    Relevant Medications    benzonatate (Tessalon) 200 mg capsule

## 2024-02-20 NOTE — PATIENT INSTRUCTIONS
Antibiotic sent to pharmacy  Tessalon perles sent to pharmacy for cough   Advised patient to push fluids and start use of cool mist humidifier  Patient to start using flonase nasal spray  Patient to call if develop new or worsening symptoms

## 2024-02-21 ENCOUNTER — TELEPHONE (OUTPATIENT)
Dept: PRIMARY CARE | Facility: CLINIC | Age: 69
End: 2024-02-21
Payer: MEDICARE

## 2024-02-21 NOTE — TELEPHONE ENCOUNTER
Please schedule patient for [facility] wellness visit in Dec 2024. Schedule with Nano or Dr. Khan. Please send this encounter to [facility] for lab orders once scheduled.     Thank you-  Corwin Antonio CMA  2/21/2024  Practice Supervisor  [facility]

## 2024-03-05 ENCOUNTER — TELEPHONE (OUTPATIENT)
Dept: PRIMARY CARE | Facility: CLINIC | Age: 69
End: 2024-03-05
Payer: MEDICARE

## 2024-03-05 NOTE — TELEPHONE ENCOUNTER
Pt would like to know if it is safe to take zyrtec for allergies, the box said to ask your dr if over age 65. Please advise

## 2024-03-06 ENCOUNTER — OFFICE VISIT (OUTPATIENT)
Dept: PRIMARY CARE | Facility: CLINIC | Age: 69
End: 2024-03-06
Payer: MEDICARE

## 2024-03-06 ENCOUNTER — HOSPITAL ENCOUNTER (OUTPATIENT)
Dept: RADIOLOGY | Facility: CLINIC | Age: 69
Discharge: HOME | End: 2024-03-06
Payer: MEDICARE

## 2024-03-06 VITALS
HEART RATE: 81 BPM | SYSTOLIC BLOOD PRESSURE: 128 MMHG | TEMPERATURE: 97.9 F | DIASTOLIC BLOOD PRESSURE: 82 MMHG | OXYGEN SATURATION: 99 % | BODY MASS INDEX: 27.59 KG/M2 | WEIGHT: 136.6 LBS

## 2024-03-06 DIAGNOSIS — J01.10 ACUTE NON-RECURRENT FRONTAL SINUSITIS: Primary | ICD-10-CM

## 2024-03-06 DIAGNOSIS — J20.9 ACUTE BRONCHITIS, UNSPECIFIED ORGANISM: ICD-10-CM

## 2024-03-06 PROCEDURE — 1036F TOBACCO NON-USER: CPT | Performed by: FAMILY MEDICINE

## 2024-03-06 PROCEDURE — 71046 X-RAY EXAM CHEST 2 VIEWS: CPT

## 2024-03-06 PROCEDURE — 1159F MED LIST DOCD IN RCRD: CPT | Performed by: FAMILY MEDICINE

## 2024-03-06 PROCEDURE — 71046 X-RAY EXAM CHEST 2 VIEWS: CPT | Performed by: RADIOLOGY

## 2024-03-06 PROCEDURE — 99214 OFFICE O/P EST MOD 30 MIN: CPT | Performed by: FAMILY MEDICINE

## 2024-03-06 PROCEDURE — 1160F RVW MEDS BY RX/DR IN RCRD: CPT | Performed by: FAMILY MEDICINE

## 2024-03-06 RX ORDER — DOXYCYCLINE 100 MG/1
100 CAPSULE ORAL 2 TIMES DAILY
Qty: 20 CAPSULE | Refills: 0 | Status: SHIPPED | OUTPATIENT
Start: 2024-03-06 | End: 2024-03-16

## 2024-03-06 RX ORDER — PREDNISONE 5 MG/1
TABLET ORAL
Qty: 55 TABLET | Refills: 0 | Status: SHIPPED | OUTPATIENT
Start: 2024-03-06

## 2024-03-06 ASSESSMENT — ENCOUNTER SYMPTOMS
FEVER: 0
SHORTNESS OF BREATH: 0
COUGH: 1
NAUSEA: 0
VOMITING: 0
WHEEZING: 0
SORE THROAT: 0
DIARRHEA: 0
CHILLS: 0
ABDOMINAL PAIN: 0
RHINORRHEA: 1

## 2024-03-06 NOTE — PROGRESS NOTES
Subjective   Chief Complaint: Cough (With nasal drainage, X 3 weeks. ).    Cough  Associated symptoms include postnasal drip and rhinorrhea. Pertinent negatives include no chest pain, chills, fever, sore throat, shortness of breath or wheezing.      Maritza Young is a 68 y.o. female who presents for Cough (With nasal drainage, X 3 weeks. ).    She reports sinus pressure, congestion, post nasal drip, and cough for 3 weeks.  Patient explains that she was on Augmentin and Tessalon Perles and did not get any better.    Patient denies fever, chills, nausea, vomiting, diarrhea, chest pain, heart palpations, or shortness of breath.     At home COVID test neg before she left her house.     Review of Systems   Constitutional:  Negative for chills and fever.   HENT:  Positive for congestion, postnasal drip and rhinorrhea. Negative for sore throat.    Respiratory:  Positive for cough. Negative for shortness of breath and wheezing.    Cardiovascular:  Negative for chest pain.   Gastrointestinal:  Negative for abdominal pain, diarrhea, nausea and vomiting.       Objective   /82 (BP Location: Left arm)   Pulse 81   Temp 36.6 °C (97.9 °F)   Wt 62 kg (136 lb 9.6 oz)   SpO2 99%   BMI 27.59 kg/m²   BSA Body surface area is 1.61 meters squared.      Physical Exam  Constitutional:       Appearance: Normal appearance.   HENT:      Right Ear: Tympanic membrane normal.      Left Ear: Tympanic membrane normal.      Nose: Congestion present.      Mouth/Throat:      Mouth: Mucous membranes are moist.   Cardiovascular:      Rate and Rhythm: Normal rate and regular rhythm.   Pulmonary:      Effort: Pulmonary effort is normal.      Breath sounds: Normal breath sounds.   Abdominal:      General: Abdomen is flat.      Palpations: Abdomen is soft.   Neurological:      Mental Status: She is alert.       Lab on 11/21/2023   Component Date Value Ref Range Status    WBC 11/21/2023 4.5  4.4 - 11.3 x10*3/uL Final    nRBC 11/21/2023 0.0  0.0 -  0.0 /100 WBCs Final    RBC 11/21/2023 4.61  4.00 - 5.20 x10*6/uL Final    Hemoglobin 11/21/2023 14.2  12.0 - 16.0 g/dL Final    Hematocrit 11/21/2023 42.1  36.0 - 46.0 % Final    MCV 11/21/2023 91  80 - 100 fL Final    MCH 11/21/2023 30.8  26.0 - 34.0 pg Final    MCHC 11/21/2023 33.7  32.0 - 36.0 g/dL Final    RDW 11/21/2023 13.0  11.5 - 14.5 % Final    Platelets 11/21/2023 201  150 - 450 x10*3/uL Final    Neutrophils % 11/21/2023 51.7  40.0 - 80.0 % Final    Immature Granulocytes %, Automated 11/21/2023 0.0  0.0 - 0.9 % Final    Immature Granulocyte Count (IG) includes promyelocytes, myelocytes and metamyelocytes but does not include bands. Percent differential counts (%) should be interpreted in the context of the absolute cell counts (cells/UL).    Lymphocytes % 11/21/2023 38.1  13.0 - 44.0 % Final    Monocytes % 11/21/2023 7.3  2.0 - 10.0 % Final    Eosinophils % 11/21/2023 2.2  0.0 - 6.0 % Final    Basophils % 11/21/2023 0.7  0.0 - 2.0 % Final    Neutrophils Absolute 11/21/2023 2.35  1.20 - 7.70 x10*3/uL Final    Percent differential counts (%) should be interpreted in the context of the absolute cell counts (cells/uL).    Immature Granulocytes Absolute, Au* 11/21/2023 0.00  0.00 - 0.70 x10*3/uL Final    Lymphocytes Absolute 11/21/2023 1.73  1.20 - 4.80 x10*3/uL Final    Monocytes Absolute 11/21/2023 0.33  0.10 - 1.00 x10*3/uL Final    Eosinophils Absolute 11/21/2023 0.10  0.00 - 0.70 x10*3/uL Final    Basophils Absolute 11/21/2023 0.03  0.00 - 0.10 x10*3/uL Final    Glucose 11/21/2023 79  74 - 99 mg/dL Final    Sodium 11/21/2023 141  136 - 145 mmol/L Final    Potassium 11/21/2023 4.1  3.5 - 5.3 mmol/L Final    Chloride 11/21/2023 104  98 - 107 mmol/L Final    Bicarbonate 11/21/2023 30  21 - 32 mmol/L Final    Anion Gap 11/21/2023 11  10 - 20 mmol/L Final    Urea Nitrogen 11/21/2023 19  6 - 23 mg/dL Final    Creatinine 11/21/2023 0.70  0.50 - 1.05 mg/dL Final    eGFR 11/21/2023 >90  >60 mL/min/1.73m*2 Final     Calculations of estimated GFR are performed using the 2021 CKD-EPI Study Refit equation without the race variable for the IDMS-Traceable creatinine methods.  https://jasn.asnjournals.org/content/early/2021/09/22/ASN.1692858404    Calcium 11/21/2023 9.3  8.6 - 10.6 mg/dL Final    Albumin 11/21/2023 4.4  3.4 - 5.0 g/dL Final    Alkaline Phosphatase 11/21/2023 79  33 - 136 U/L Final    Total Protein 11/21/2023 6.6  6.4 - 8.2 g/dL Final    AST 11/21/2023 18  9 - 39 U/L Final    Bilirubin, Total 11/21/2023 0.5  0.0 - 1.2 mg/dL Final    ALT 11/21/2023 16  7 - 45 U/L Final    Patients treated with Sulfasalazine may generate falsely decreased results for ALT.    Cholesterol 11/21/2023 175  0 - 199 mg/dL Final          Age      Desirable   Borderline High   High     0-19 Y     0 - 169       170 - 199     >/= 200    20-24 Y     0 - 189       190 - 224     >/= 225         >24 Y     0 - 199       200 - 239     >/= 240   **All ranges are based on fasting samples. Specific   therapeutic targets will vary based on patient-specific   cardiac risk.    Pediatric guidelines reference:Pediatrics 2011, 128(S5).Adult guidelines reference: NCEP ATPIII Guidelines,NICOLE 2001, 258:2486-97    Venipuncture immediately after or during the administration of Metamizole may lead to falsely low results. Testing should be performed immediately prior to Metamizole dosing.    HDL-Cholesterol 11/21/2023 41.4  mg/dL Final      Age       Very Low   Low     Normal    High    0-19 Y    < 35      < 40     40-45     ----  20-24 Y    ----     < 40      >45      ----        >24 Y      ----     < 40     40-60      >60      Cholesterol/HDL Ratio 11/21/2023 4.2   Final      Ref Values  Desirable  < 3.4  High Risk  > 5.0    LDL Calculated 11/21/2023 88  <=99 mg/dL Final                                Near   Borderline      AGE      Desirable  Optimal    High     High     Very High     0-19 Y     0 - 109     ---    110-129   >/= 130     ----    20-24 Y     0 - 119      ---    120-159   >/= 160     ----      >24 Y     0 -  99   100-129  130-159   160-189     >/=190      VLDL 11/21/2023 46 (H)  0 - 40 mg/dL Final    Triglycerides 11/21/2023 228 (H)  0 - 149 mg/dL Final       Age         Desirable   Borderline High   High     Very High   0 D-90 D    19 - 174         ----         ----        ----  91 D- 9 Y     0 -  74        75 -  99     >/= 100      ----    10-19 Y     0 -  89        90 - 129     >/= 130      ----    20-24 Y     0 - 114       115 - 149     >/= 150      ----         >24 Y     0 - 149       150 - 199    200- 499    >/= 500    Venipuncture immediately after or during the administration of Metamizole may lead to falsely low results. Testing should be performed immediately prior to Metamizole dosing.    Non HDL Cholesterol 11/21/2023 134  0 - 149 mg/dL Final          Age       Desirable   Borderline High   High     Very High     0-19 Y     0 - 119       120 - 144     >/= 145    >/= 160    20-24 Y     0 - 149       150 - 189     >/= 190      ----         >24 Y    30 mg/dL above LDL Cholesterol goal      Thyroid Stimulating Hormone 11/21/2023 2.20  0.44 - 3.98 mIU/L Final     Current Outpatient Medications on File Prior to Visit   Medication Sig Dispense Refill    calcium citrate-vitamin D2 250 mg-2.5 mcg (100 unit) tablet Take 250 mg by mouth once daily.      flaxseed oiL oil Take 1,000 mg by mouth once daily.      fluticasone (Flonase) 50 mcg/actuation nasal spray Administer 2 sprays into each nostril once daily. Shake gently. Before first use, prime pump. After use, clean tip and replace cap. 16 g 1    levothyroxine (Synthroid, Levoxyl) 50 mcg tablet TAKE 1 & 1/2 TABLETS BY MOUTH EVERY  tablet 1    vitamins A,C,E-zinc-copper (PreserVision AREDS) 2,148 mcg-113 mg-45 mg-17.4mg tablet Take 1 tablet by mouth once daily.      [DISCONTINUED] amoxicillin-pot clavulanate (Augmentin) 875-125 mg tablet Take 1 tablet (875 mg) by mouth 2 times a day for 10 days. 20 tablet  0    [DISCONTINUED] benzonatate (Tessalon) 200 mg capsule Take 1 capsule (200 mg) by mouth 3 times a day as needed for cough. Do not crush or chew. (Patient not taking: Reported on 3/6/2024) 42 capsule 0     No current facility-administered medications on file prior to visit.     No images are attached to the encounter.            Assessment/Plan   Problem List Items Addressed This Visit             ICD-10-CM    Acute non-recurrent frontal sinusitis - Primary J01.10    Relevant Medications    predniSONE (Deltasone) 5 mg tablet     Other Visit Diagnoses         Codes    Acute bronchitis, unspecified organism     J20.9    Relevant Medications    doxycycline (Vibramycin) 100 mg capsule    Other Relevant Orders    XR chest 2 views

## 2024-03-13 DIAGNOSIS — J01.10 ACUTE NON-RECURRENT FRONTAL SINUSITIS: ICD-10-CM

## 2024-03-13 RX ORDER — FLUTICASONE PROPIONATE 50 MCG
SPRAY, SUSPENSION (ML) NASAL
Qty: 48 ML | Refills: 3 | Status: SHIPPED | OUTPATIENT
Start: 2024-03-13 | End: 2024-03-18

## 2024-03-18 DIAGNOSIS — J01.10 ACUTE NON-RECURRENT FRONTAL SINUSITIS: ICD-10-CM

## 2024-03-18 RX ORDER — FLUTICASONE PROPIONATE 50 MCG
SPRAY, SUSPENSION (ML) NASAL
Qty: 48 ML | Refills: 3 | Status: SHIPPED | OUTPATIENT
Start: 2024-03-18

## 2024-03-18 RX ORDER — FLUTICASONE PROPIONATE 50 MCG
SPRAY, SUSPENSION (ML) NASAL
Qty: 48 ML | Refills: 3 | Status: SHIPPED | OUTPATIENT
Start: 2024-03-18 | End: 2024-03-18 | Stop reason: SDUPTHER

## 2024-05-13 DIAGNOSIS — E03.9 HYPOTHYROIDISM, UNSPECIFIED: ICD-10-CM

## 2024-05-13 RX ORDER — LEVOTHYROXINE SODIUM 50 UG/1
75 TABLET ORAL DAILY
Qty: 135 TABLET | Refills: 1 | Status: SHIPPED | OUTPATIENT
Start: 2024-05-13

## 2024-06-20 ENCOUNTER — HOSPITAL ENCOUNTER (OUTPATIENT)
Dept: RADIOLOGY | Facility: CLINIC | Age: 69
Discharge: HOME | End: 2024-06-20
Payer: MEDICARE

## 2024-06-20 ENCOUNTER — LAB (OUTPATIENT)
Dept: LAB | Facility: LAB | Age: 69
End: 2024-06-20
Payer: MEDICARE

## 2024-06-20 ENCOUNTER — OFFICE VISIT (OUTPATIENT)
Dept: PRIMARY CARE | Facility: CLINIC | Age: 69
End: 2024-06-20
Payer: MEDICARE

## 2024-06-20 VITALS
DIASTOLIC BLOOD PRESSURE: 80 MMHG | BODY MASS INDEX: 28.63 KG/M2 | TEMPERATURE: 97.1 F | OXYGEN SATURATION: 96 % | HEART RATE: 67 BPM | HEIGHT: 59 IN | SYSTOLIC BLOOD PRESSURE: 134 MMHG | WEIGHT: 142 LBS

## 2024-06-20 DIAGNOSIS — M25.561 ACUTE PAIN OF RIGHT KNEE: ICD-10-CM

## 2024-06-20 DIAGNOSIS — F33.42 RECURRENT MAJOR DEPRESSIVE DISORDER, IN FULL REMISSION (CMS-HCC): Primary | ICD-10-CM

## 2024-06-20 DIAGNOSIS — C50.919 MALIGNANT NEOPLASM OF FEMALE BREAST, UNSPECIFIED ESTROGEN RECEPTOR STATUS, UNSPECIFIED LATERALITY, UNSPECIFIED SITE OF BREAST (MULTI): ICD-10-CM

## 2024-06-20 PROCEDURE — 84550 ASSAY OF BLOOD/URIC ACID: CPT

## 2024-06-20 PROCEDURE — 87476 LYME DIS DNA AMP PROBE: CPT

## 2024-06-20 PROCEDURE — 86038 ANTINUCLEAR ANTIBODIES: CPT

## 2024-06-20 PROCEDURE — 73562 X-RAY EXAM OF KNEE 3: CPT | Mod: RT

## 2024-06-20 PROCEDURE — 1160F RVW MEDS BY RX/DR IN RCRD: CPT | Performed by: FAMILY MEDICINE

## 2024-06-20 PROCEDURE — 1036F TOBACCO NON-USER: CPT | Performed by: FAMILY MEDICINE

## 2024-06-20 PROCEDURE — 36415 COLL VENOUS BLD VENIPUNCTURE: CPT

## 2024-06-20 PROCEDURE — 85025 COMPLETE CBC W/AUTO DIFF WBC: CPT

## 2024-06-20 PROCEDURE — 99213 OFFICE O/P EST LOW 20 MIN: CPT | Performed by: FAMILY MEDICINE

## 2024-06-20 PROCEDURE — 86431 RHEUMATOID FACTOR QUANT: CPT

## 2024-06-20 PROCEDURE — 73562 X-RAY EXAM OF KNEE 3: CPT | Mod: RIGHT SIDE | Performed by: RADIOLOGY

## 2024-06-20 PROCEDURE — 86225 DNA ANTIBODY NATIVE: CPT

## 2024-06-20 PROCEDURE — 86140 C-REACTIVE PROTEIN: CPT

## 2024-06-20 PROCEDURE — 86235 NUCLEAR ANTIGEN ANTIBODY: CPT

## 2024-06-20 PROCEDURE — 85652 RBC SED RATE AUTOMATED: CPT

## 2024-06-20 PROCEDURE — 1159F MED LIST DOCD IN RCRD: CPT | Performed by: FAMILY MEDICINE

## 2024-06-20 RX ORDER — MELOXICAM 15 MG/1
15 TABLET ORAL DAILY
Qty: 14 TABLET | Refills: 0 | Status: SHIPPED | OUTPATIENT
Start: 2024-06-20 | End: 2024-07-04

## 2024-06-20 ASSESSMENT — ENCOUNTER SYMPTOMS
WHEEZING: 0
MYALGIAS: 0
FATIGUE: 0
STRIDOR: 0
LOSS OF SENSATION IN FEET: 0
APPETITE CHANGE: 0
ARTHRALGIAS: 1
RESPIRATORY NEGATIVE: 1
CHOKING: 0
DEPRESSION: 0
OCCASIONAL FEELINGS OF UNSTEADINESS: 0

## 2024-06-20 ASSESSMENT — PATIENT HEALTH QUESTIONNAIRE - PHQ9
1. LITTLE INTEREST OR PLEASURE IN DOING THINGS: NOT AT ALL
10. IF YOU CHECKED OFF ANY PROBLEMS, HOW DIFFICULT HAVE THESE PROBLEMS MADE IT FOR YOU TO DO YOUR WORK, TAKE CARE OF THINGS AT HOME, OR GET ALONG WITH OTHER PEOPLE: NOT DIFFICULT AT ALL
SUM OF ALL RESPONSES TO PHQ9 QUESTIONS 1 AND 2: 0
2. FEELING DOWN, DEPRESSED OR HOPELESS: NOT AT ALL

## 2024-06-20 NOTE — PATIENT INSTRUCTIONS
Evaluating right knee pain.    X-ray of the right knee is going to be performed.    Uric acid level, sed rate, C-reactive protein could not be performed DIMA rheumatoid factor CBC going to be performed as well.    Lyme titer also has been ordered.    Starting meloxicam 1 daily.  Do not take other anti-inflammatory with this medicine.  Would like to know how you are doing in the next 7 days.  If any troubles with redness warmth or increasing pain or discomfort, please let me know

## 2024-06-20 NOTE — PROGRESS NOTES
"Subjective   Patient ID: Maritza Young is a 68 y.o. female who presents for Knee Pain (Right ; swelling in right knee and ankle).    Patient presents with right knee pain.  Some swelling in the right knee and right ankle.    This pretty much started on May 20.    Patient notes initially she had some discomfort behind the knee and now it is really localized to the medial aspect of the knee has been no redness no warmth.    It is not unstable but feels like it could become that.  She had no direct trauma to the area she does not recall any sudden movements that seem to aggravate it.  She has had no swelling in the calf no calf pain or discomfort no redness no warmth.    She does get some relief with ibuprofen but does not want to take it on a regular basis.  She does admit to having a tick bite sometime perhaps in the last year    Comes and goes.    Some releif ibu[profen.    No redness     Knee Pain          Review of Systems   Constitutional:  Negative for appetite change and fatigue.   Respiratory: Negative.  Negative for choking, wheezing and stridor.    Genitourinary:  Negative for enuresis.   Musculoskeletal:  Positive for arthralgias. Negative for gait problem and myalgias.   Allergic/Immunologic: Negative for environmental allergies and food allergies.       Objective   /80   Pulse 67   Temp 36.2 °C (97.1 °F)   Ht 1.499 m (4' 11\")   Wt 64.4 kg (142 lb)   SpO2 96%   BMI 28.68 kg/m²   BSA Body surface area is 1.64 meters squared.      Physical Exam  Constitutional:       General: She is not in acute distress.     Appearance: She is not toxic-appearing.   Cardiovascular:      Rate and Rhythm: Normal rate and regular rhythm.      Pulses: Normal pulses.      Heart sounds: Normal heart sounds.   Musculoskeletal:      Cervical back: Normal range of motion.         Examination of the knee reveals some tenderness noted over the medial joint space minimal tenderness noted in the popliteal fossa no calf pain " or discomfort dorsalis pedis pulses strong.    Anterior posterior drawer sign intact.  Lachman testing unremarkable Sloan's testing with valgus stressing revealed pain discomfort along the medial joint line.    Dorsalis pedis pulses strong.  Lab on 11/21/2023   Component Date Value Ref Range Status    WBC 11/21/2023 4.5  4.4 - 11.3 x10*3/uL Final    nRBC 11/21/2023 0.0  0.0 - 0.0 /100 WBCs Final    RBC 11/21/2023 4.61  4.00 - 5.20 x10*6/uL Final    Hemoglobin 11/21/2023 14.2  12.0 - 16.0 g/dL Final    Hematocrit 11/21/2023 42.1  36.0 - 46.0 % Final    MCV 11/21/2023 91  80 - 100 fL Final    MCH 11/21/2023 30.8  26.0 - 34.0 pg Final    MCHC 11/21/2023 33.7  32.0 - 36.0 g/dL Final    RDW 11/21/2023 13.0  11.5 - 14.5 % Final    Platelets 11/21/2023 201  150 - 450 x10*3/uL Final    Neutrophils % 11/21/2023 51.7  40.0 - 80.0 % Final    Immature Granulocytes %, Automated 11/21/2023 0.0  0.0 - 0.9 % Final    Immature Granulocyte Count (IG) includes promyelocytes, myelocytes and metamyelocytes but does not include bands. Percent differential counts (%) should be interpreted in the context of the absolute cell counts (cells/UL).    Lymphocytes % 11/21/2023 38.1  13.0 - 44.0 % Final    Monocytes % 11/21/2023 7.3  2.0 - 10.0 % Final    Eosinophils % 11/21/2023 2.2  0.0 - 6.0 % Final    Basophils % 11/21/2023 0.7  0.0 - 2.0 % Final    Neutrophils Absolute 11/21/2023 2.35  1.20 - 7.70 x10*3/uL Final    Percent differential counts (%) should be interpreted in the context of the absolute cell counts (cells/uL).    Immature Granulocytes Absolute, Au* 11/21/2023 0.00  0.00 - 0.70 x10*3/uL Final    Lymphocytes Absolute 11/21/2023 1.73  1.20 - 4.80 x10*3/uL Final    Monocytes Absolute 11/21/2023 0.33  0.10 - 1.00 x10*3/uL Final    Eosinophils Absolute 11/21/2023 0.10  0.00 - 0.70 x10*3/uL Final    Basophils Absolute 11/21/2023 0.03  0.00 - 0.10 x10*3/uL Final    Glucose 11/21/2023 79  74 - 99 mg/dL Final    Sodium 11/21/2023 141   136 - 145 mmol/L Final    Potassium 11/21/2023 4.1  3.5 - 5.3 mmol/L Final    Chloride 11/21/2023 104  98 - 107 mmol/L Final    Bicarbonate 11/21/2023 30  21 - 32 mmol/L Final    Anion Gap 11/21/2023 11  10 - 20 mmol/L Final    Urea Nitrogen 11/21/2023 19  6 - 23 mg/dL Final    Creatinine 11/21/2023 0.70  0.50 - 1.05 mg/dL Final    eGFR 11/21/2023 >90  >60 mL/min/1.73m*2 Final    Calculations of estimated GFR are performed using the 2021 CKD-EPI Study Refit equation without the race variable for the IDMS-Traceable creatinine methods.  https://jasn.asnjournals.org/content/early/2021/09/22/ASN.0726539751    Calcium 11/21/2023 9.3  8.6 - 10.6 mg/dL Final    Albumin 11/21/2023 4.4  3.4 - 5.0 g/dL Final    Alkaline Phosphatase 11/21/2023 79  33 - 136 U/L Final    Total Protein 11/21/2023 6.6  6.4 - 8.2 g/dL Final    AST 11/21/2023 18  9 - 39 U/L Final    Bilirubin, Total 11/21/2023 0.5  0.0 - 1.2 mg/dL Final    ALT 11/21/2023 16  7 - 45 U/L Final    Patients treated with Sulfasalazine may generate falsely decreased results for ALT.    Cholesterol 11/21/2023 175  0 - 199 mg/dL Final          Age      Desirable   Borderline High   High     0-19 Y     0 - 169       170 - 199     >/= 200    20-24 Y     0 - 189       190 - 224     >/= 225         >24 Y     0 - 199       200 - 239     >/= 240   **All ranges are based on fasting samples. Specific   therapeutic targets will vary based on patient-specific   cardiac risk.    Pediatric guidelines reference:Pediatrics 2011, 128(S5).Adult guidelines reference: NCEP ATPIII Guidelines,NICOLE 2001, 258:2486-97    Venipuncture immediately after or during the administration of Metamizole may lead to falsely low results. Testing should be performed immediately prior to Metamizole dosing.    HDL-Cholesterol 11/21/2023 41.4  mg/dL Final      Age       Very Low   Low     Normal    High    0-19 Y    < 35      < 40     40-45     ----  20-24 Y    ----     < 40      >45      ----        >24 Y       ----     < 40     40-60      >60      Cholesterol/HDL Ratio 11/21/2023 4.2   Final      Ref Values  Desirable  < 3.4  High Risk  > 5.0    LDL Calculated 11/21/2023 88  <=99 mg/dL Final                                Near   Borderline      AGE      Desirable  Optimal    High     High     Very High     0-19 Y     0 - 109     ---    110-129   >/= 130     ----    20-24 Y     0 - 119     ---    120-159   >/= 160     ----      >24 Y     0 -  99   100-129  130-159   160-189     >/=190      VLDL 11/21/2023 46 (H)  0 - 40 mg/dL Final    Triglycerides 11/21/2023 228 (H)  0 - 149 mg/dL Final       Age         Desirable   Borderline High   High     Very High   0 D-90 D    19 - 174         ----         ----        ----  91 D- 9 Y     0 -  74        75 -  99     >/= 100      ----    10-19 Y     0 -  89        90 - 129     >/= 130      ----    20-24 Y     0 - 114       115 - 149     >/= 150      ----         >24 Y     0 - 149       150 - 199    200- 499    >/= 500    Venipuncture immediately after or during the administration of Metamizole may lead to falsely low results. Testing should be performed immediately prior to Metamizole dosing.    Non HDL Cholesterol 11/21/2023 134  0 - 149 mg/dL Final          Age       Desirable   Borderline High   High     Very High     0-19 Y     0 - 119       120 - 144     >/= 145    >/= 160    20-24 Y     0 - 149       150 - 189     >/= 190      ----         >24 Y    30 mg/dL above LDL Cholesterol goal      Thyroid Stimulating Hormone 11/21/2023 2.20  0.44 - 3.98 mIU/L Final     Current Outpatient Medications on File Prior to Visit   Medication Sig Dispense Refill    calcium citrate-vitamin D2 250 mg-2.5 mcg (100 unit) tablet Take 250 mg by mouth once daily.      flaxseed oiL oil Take 1,000 mg by mouth once daily.      fluticasone (Flonase) 50 mcg/actuation nasal spray PLEASE SEE ATTACHED FOR DETAILED DIRECTIONS 48 mL 3    levothyroxine (Synthroid, Levoxyl) 50 mcg tablet TAKE 1 AND 1/2 TABLETS  DAILY BY MOUTH 135 tablet 1    vitamins A,C,E-zinc-copper (PreserVision AREDS) 2,148 mcg-113 mg-45 mg-17.4mg tablet Take 1 tablet by mouth once daily.      predniSONE (Deltasone) 5 mg tablet 10 then 9 then 8 then 7 etc. until gone 55 tablet 0     No current facility-administered medications on file prior to visit.     No images are attached to the encounter.            Assessment/Plan   Problem List Items Addressed This Visit             ICD-10-CM    Recurrent major depressive disorder, in full remission (CMS-HCC) - Primary F33.42     Stable, depression has been doing well         Malignant neoplasm of female breast, unspecified estrogen receptor status, unspecified laterality, unspecified site of breast (Multi) C50.919     Other Visit Diagnoses         Codes    Acute pain of right knee     M25.561

## 2024-06-21 LAB
BASOPHILS # BLD AUTO: 0.04 X10*3/UL (ref 0–0.1)
BASOPHILS NFR BLD AUTO: 0.7 %
CRP SERPL-MCNC: 0.27 MG/DL
EOSINOPHIL # BLD AUTO: 0.15 X10*3/UL (ref 0–0.7)
EOSINOPHIL NFR BLD AUTO: 2.5 %
ERYTHROCYTE [DISTWIDTH] IN BLOOD BY AUTOMATED COUNT: 12.5 % (ref 11.5–14.5)
ERYTHROCYTE [SEDIMENTATION RATE] IN BLOOD BY WESTERGREN METHOD: 8 MM/H (ref 0–30)
HCT VFR BLD AUTO: 41.2 % (ref 36–46)
HGB BLD-MCNC: 13.5 G/DL (ref 12–16)
IMM GRANULOCYTES # BLD AUTO: 0.01 X10*3/UL (ref 0–0.7)
IMM GRANULOCYTES NFR BLD AUTO: 0.2 % (ref 0–0.9)
LYMPHOCYTES # BLD AUTO: 1.85 X10*3/UL (ref 1.2–4.8)
LYMPHOCYTES NFR BLD AUTO: 31.4 %
MCH RBC QN AUTO: 29.8 PG (ref 26–34)
MCHC RBC AUTO-ENTMCNC: 32.8 G/DL (ref 32–36)
MCV RBC AUTO: 91 FL (ref 80–100)
MONOCYTES # BLD AUTO: 0.43 X10*3/UL (ref 0.1–1)
MONOCYTES NFR BLD AUTO: 7.3 %
NEUTROPHILS # BLD AUTO: 3.42 X10*3/UL (ref 1.2–7.7)
NEUTROPHILS NFR BLD AUTO: 57.9 %
NRBC BLD-RTO: 0 /100 WBCS (ref 0–0)
PLATELET # BLD AUTO: 245 X10*3/UL (ref 150–450)
RBC # BLD AUTO: 4.53 X10*6/UL (ref 4–5.2)
RHEUMATOID FACT SER NEPH-ACNC: <10 IU/ML (ref 0–15)
URATE SERPL-MCNC: 4.9 MG/DL (ref 2.3–6.7)
WBC # BLD AUTO: 5.9 X10*3/UL (ref 4.4–11.3)

## 2024-06-23 LAB
B BURGDOR DNA SPEC QL NAA+PROBE: NOT DETECTED
SPECIMEN SOURCE: NORMAL

## 2024-06-24 LAB
ANA PATTERN: ABNORMAL
ANA SER QL HEP2 SUBST: POSITIVE
ANA TITR SER IF: ABNORMAL {TITER}
CENTROMERE B AB SER-ACNC: <0.2 AI
CHROMATIN AB SERPL-ACNC: <0.2 AI
DSDNA AB SER-ACNC: <1 IU/ML
ENA JO1 AB SER QL IA: <0.2 AI
ENA RNP AB SER IA-ACNC: 0.4 AI
ENA SCL70 AB SER QL IA: <0.2 AI
ENA SM AB SER IA-ACNC: <0.2 AI
ENA SM+RNP AB SER QL IA: <0.2 AI
ENA SS-A AB SER IA-ACNC: <0.2 AI
ENA SS-B AB SER IA-ACNC: <0.2 AI
RIBOSOMAL P AB SER-ACNC: <0.2 AI

## 2024-06-25 DIAGNOSIS — R76.8 ANA POSITIVE: ICD-10-CM

## 2024-07-08 ENCOUNTER — TELEPHONE (OUTPATIENT)
Dept: PRIMARY CARE | Facility: CLINIC | Age: 69
End: 2024-07-08
Payer: MEDICARE

## 2024-07-08 NOTE — TELEPHONE ENCOUNTER
Pt called in stating she has been on meloxicam (Mobic) 15 mg tablet for about 2 weeks but her knee is still swollen, and painful. Pt would like to know what is the next step? Does she need to make an appointment with you or can you put in a referral for her? Please advise    Pt number: [phone]

## 2024-07-12 ENCOUNTER — APPOINTMENT (OUTPATIENT)
Dept: PRIMARY CARE | Facility: CLINIC | Age: 69
End: 2024-07-12
Payer: MEDICARE

## 2024-07-29 ENCOUNTER — APPOINTMENT (OUTPATIENT)
Dept: PRIMARY CARE | Facility: CLINIC | Age: 69
End: 2024-07-29
Payer: MEDICARE

## 2024-07-29 VITALS
BODY MASS INDEX: 28.48 KG/M2 | RESPIRATION RATE: 16 BRPM | HEART RATE: 58 BPM | WEIGHT: 141 LBS | DIASTOLIC BLOOD PRESSURE: 78 MMHG | OXYGEN SATURATION: 94 % | SYSTOLIC BLOOD PRESSURE: 127 MMHG

## 2024-07-29 DIAGNOSIS — F43.20 ADJUSTMENT DISORDER, UNSPECIFIED TYPE: ICD-10-CM

## 2024-07-29 DIAGNOSIS — S83.206D ACUTE MENISCAL TEAR OF RIGHT KNEE, SUBSEQUENT ENCOUNTER: Primary | ICD-10-CM

## 2024-07-29 DIAGNOSIS — F41.9 ANXIETY: ICD-10-CM

## 2024-07-29 PROBLEM — S83.206A ACUTE MENISCAL TEAR OF RIGHT KNEE: Status: ACTIVE | Noted: 2024-07-29

## 2024-07-29 PROCEDURE — 1036F TOBACCO NON-USER: CPT | Performed by: FAMILY MEDICINE

## 2024-07-29 PROCEDURE — 99214 OFFICE O/P EST MOD 30 MIN: CPT | Performed by: FAMILY MEDICINE

## 2024-07-29 PROCEDURE — 1159F MED LIST DOCD IN RCRD: CPT | Performed by: FAMILY MEDICINE

## 2024-07-29 RX ORDER — SERTRALINE HYDROCHLORIDE 50 MG/1
50 TABLET, FILM COATED ORAL DAILY
Qty: 30 TABLET | Refills: 0 | Status: SHIPPED | OUTPATIENT
Start: 2024-07-29 | End: 2024-09-27

## 2024-07-29 RX ORDER — ASPIRIN 81 MG/1
81 TABLET ORAL DAILY
COMMUNITY

## 2024-07-29 ASSESSMENT — ENCOUNTER SYMPTOMS
SLEEP DISTURBANCE: 1
NERVOUS/ANXIOUS: 1
ACTIVITY CHANGE: 0
ARTHRALGIAS: 1
DYSPHORIC MOOD: 1
CHILLS: 0

## 2024-07-29 NOTE — PROGRESS NOTES
Subjective   Patient ID: Maritza Young is a 68 y.o. female who presents for Knee Pain (And some swelling; right.  ).    Some times with.  Not .  2 mile loop.  Patient still in pain discomfort in the right knee.  Has had some swelling in this area.    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    Sometimes with extension of the knee sometimes if she moves quickly feels some popping some cracking in the area.    She is not falling and it does not feel unstable.  She is also had some issues of anxiety.  Would like to try something to help with this.    This stems from her son moving to [place] which has been stressful because it seemed as though the daughter-in-law does not want him to come in visit or have contact with him.  She has had no thoughts of suicide or self-harm but it does cause issue for her.    Also has had issues with her .    He has a tremendous sexual drive.  She does not she would like to have something to help with anxiety as well.    Not unstable.    Better and worse    Son in [place].    Knee Pain      patient presents having some persistent knee pain and discomfort    Review of Systems   Constitutional:  Negative for activity change and chills.   HENT: Negative.     Musculoskeletal:  Positive for arthralgias.   Psychiatric/Behavioral:  Positive for dysphoric mood and sleep disturbance. Negative for behavioral problems. The patient is nervous/anxious.        Objective   /78 (BP Location: Left arm, Patient Position: Sitting, BP Cuff Size: Adult)   Pulse 58   Resp 16   Wt 64 kg (141 lb)   SpO2 94%   BMI 28.48 kg/m²   BSA Body surface area is 1.63 meters squared.      Physical Exam  Cardiovascular:      Rate and Rhythm: Normal rate and regular rhythm.   Pulmonary:      Effort: Pulmonary effort is normal.      Breath sounds: Normal breath sounds.   Abdominal:      General: Abdomen is flat.   Musculoskeletal:      Cervical back: Normal  range of motion.   Psychiatric:         Behavior: Behavior normal.         Judgment: Judgment normal.     Right knee revealed tenderness with extension.  With valgus stressing some popping some tenderness was noted.    Some difficulty with full extension.  There is no redness no warmth.  Lab on 06/20/2024   Component Date Value Ref Range Status    Uric Acid 06/20/2024 4.9  2.3 - 6.7 mg/dL Final    Venipuncture immediately after or during the administration of Metamizole may lead to falsely low results. Testing should be performed immediately  prior to Metamizole dosing.    Sedimentation Rate 06/20/2024 8  0 - 30 mm/h Final    C-Reactive Protein 06/20/2024 0.27  <1.00 mg/dL Final    DIMA 06/20/2024 Positive (A)  Negative Final    The Antinuclear Antibody (DIMA) test was performed using  indirect immunofluorescence assay with HEp-2 cells slide.    DIMA Pattern 06/20/2024 Dense Fine Speckled   Final    DIMA Titer 06/20/2024 1:80   Final    Rheumatoid Factor 06/20/2024 <10  0 - 15 IU/mL Final    Lyme Disease (Borrelia burgdorferi* 06/20/2024 Not Detected   Final    NOT DETECTED - A negative result does not rule out the   presence of PCR inhibitors in the patient specimen or   assay specific nucleic acid in concentrations below the   level of detection by the assay.     Blood and CSF specimens have poor clinical sensitivity for   detection of Borrelia burgdorferi by PCR.  INTERPRETIVE INFORMATION: Borrelia Species DNA Detection by PCR    This test was developed and its performance characteristics   determined by [facility]. It has not been cleared or   approved by the US Food and Drug Administration. This test was   performed in a CLIA certified laboratory and is intended for   clinical purposes.  Performed By: [facility]  [address] [place] [id number]  : Román Vallejo MD, PhD  CLIA Number: [id number]    Lyme Source 06/20/2024 Blood   Final    WBC 06/20/2024 5.9  4.4 - 11.3  x10*3/uL Final    nRBC 06/20/2024 0.0  0.0 - 0.0 /100 WBCs Final    RBC 06/20/2024 4.53  4.00 - 5.20 x10*6/uL Final    Hemoglobin 06/20/2024 13.5  12.0 - 16.0 g/dL Final    Hematocrit 06/20/2024 41.2  36.0 - 46.0 % Final    MCV 06/20/2024 91  80 - 100 fL Final    MCH 06/20/2024 29.8  26.0 - 34.0 pg Final    MCHC 06/20/2024 32.8  32.0 - 36.0 g/dL Final    RDW 06/20/2024 12.5  11.5 - 14.5 % Final    Platelets 06/20/2024 245  150 - 450 x10*3/uL Final    Neutrophils % 06/20/2024 57.9  40.0 - 80.0 % Final    Immature Granulocytes %, Automated 06/20/2024 0.2  0.0 - 0.9 % Final    Immature Granulocyte Count (IG) includes promyelocytes, myelocytes and metamyelocytes but does not include bands. Percent differential counts (%) should be interpreted in the context of the absolute cell counts (cells/UL).    Lymphocytes % 06/20/2024 31.4  13.0 - 44.0 % Final    Monocytes % 06/20/2024 7.3  2.0 - 10.0 % Final    Eosinophils % 06/20/2024 2.5  0.0 - 6.0 % Final    Basophils % 06/20/2024 0.7  0.0 - 2.0 % Final    Neutrophils Absolute 06/20/2024 3.42  1.20 - 7.70 x10*3/uL Final    Percent differential counts (%) should be interpreted in the context of the absolute cell counts (cells/uL).    Immature Granulocytes Absolute, Au* 06/20/2024 0.01  0.00 - 0.70 x10*3/uL Final    Lymphocytes Absolute 06/20/2024 1.85  1.20 - 4.80 x10*3/uL Final    Monocytes Absolute 06/20/2024 0.43  0.10 - 1.00 x10*3/uL Final    Eosinophils Absolute 06/20/2024 0.15  0.00 - 0.70 x10*3/uL Final    Basophils Absolute 06/20/2024 0.04  0.00 - 0.10 x10*3/uL Final    Anti-SM 06/20/2024 <0.2  <1.0 AI Final    < 1.0 = NEGATIVE  >=1.0 = POSITIVE    Anti-RNP 06/20/2024 0.4  <1.0 AI Final    < 1.0 = NEGATIVE  >=1.0 = POSITIVE    Anti-SM/RNP 06/20/2024 <0.2  <1.0 AI Final    < 1.0 = NEGATIVE  >=1.0 = POSITIVE    Anti-SSA 06/20/2024 <0.2  <1.0 AI Final    < 1.0 = NEGATIVE  >=1.0 = POSITIVE    Anti-SSB 06/20/2024 <0.2  <1.0 AI Final    < 1.0 = NEGATIVE  >=1.0 = POSITIVE     Anti-SCL-70 06/20/2024 <0.2  <1.0 AI Final    < 1.0 = NEGATIVE  >=1.0 = POSITIVE    Anti-KIMBERLEY-1 IgG 06/20/2024 <0.2  <1.0 AI Final    < 1.0 = NEGATIVE  >=1.0 = POSITIVE    Anti-Chromatin 06/20/2024 <0.2  <1.0 AI Final    < 1.0 = NEGATIVE  >=1.0 = POSITIVE    Anti-Centromere 06/20/2024 <0.2  <1.0 AI Final    < 1.0 = NEGATIVE  >=1.0 = POSITIVE    ANTI-RIBOSOMAL P 06/20/2024 <0.2  <1.0 AI Final    < 1.0 = NEGATIVE  >=1.0 = POSITIVE    Anti-DNA (DS) 06/20/2024 <1.0  <5.0 IU/mL Final    NEGATIVE: <= 4 IU/ML  EQUIVOCAL: 5- 9 IU/ML  POSITIVE: >=10 IU/ML   Lab on 11/21/2023   Component Date Value Ref Range Status    WBC 11/21/2023 4.5  4.4 - 11.3 x10*3/uL Final    nRBC 11/21/2023 0.0  0.0 - 0.0 /100 WBCs Final    RBC 11/21/2023 4.61  4.00 - 5.20 x10*6/uL Final    Hemoglobin 11/21/2023 14.2  12.0 - 16.0 g/dL Final    Hematocrit 11/21/2023 42.1  36.0 - 46.0 % Final    MCV 11/21/2023 91  80 - 100 fL Final    MCH 11/21/2023 30.8  26.0 - 34.0 pg Final    MCHC 11/21/2023 33.7  32.0 - 36.0 g/dL Final    RDW 11/21/2023 13.0  11.5 - 14.5 % Final    Platelets 11/21/2023 201  150 - 450 x10*3/uL Final    Neutrophils % 11/21/2023 51.7  40.0 - 80.0 % Final    Immature Granulocytes %, Automated 11/21/2023 0.0  0.0 - 0.9 % Final    Immature Granulocyte Count (IG) includes promyelocytes, myelocytes and metamyelocytes but does not include bands. Percent differential counts (%) should be interpreted in the context of the absolute cell counts (cells/UL).    Lymphocytes % 11/21/2023 38.1  13.0 - 44.0 % Final    Monocytes % 11/21/2023 7.3  2.0 - 10.0 % Final    Eosinophils % 11/21/2023 2.2  0.0 - 6.0 % Final    Basophils % 11/21/2023 0.7  0.0 - 2.0 % Final    Neutrophils Absolute 11/21/2023 2.35  1.20 - 7.70 x10*3/uL Final    Percent differential counts (%) should be interpreted in the context of the absolute cell counts (cells/uL).    Immature Granulocytes Absolute, Au* 11/21/2023 0.00  0.00 - 0.70 x10*3/uL Final    Lymphocytes Absolute  11/21/2023 1.73  1.20 - 4.80 x10*3/uL Final    Monocytes Absolute 11/21/2023 0.33  0.10 - 1.00 x10*3/uL Final    Eosinophils Absolute 11/21/2023 0.10  0.00 - 0.70 x10*3/uL Final    Basophils Absolute 11/21/2023 0.03  0.00 - 0.10 x10*3/uL Final    Glucose 11/21/2023 79  74 - 99 mg/dL Final    Sodium 11/21/2023 141  136 - 145 mmol/L Final    Potassium 11/21/2023 4.1  3.5 - 5.3 mmol/L Final    Chloride 11/21/2023 104  98 - 107 mmol/L Final    Bicarbonate 11/21/2023 30  21 - 32 mmol/L Final    Anion Gap 11/21/2023 11  10 - 20 mmol/L Final    Urea Nitrogen 11/21/2023 19  6 - 23 mg/dL Final    Creatinine 11/21/2023 0.70  0.50 - 1.05 mg/dL Final    eGFR 11/21/2023 >90  >60 mL/min/1.73m*2 Final    Calculations of estimated GFR are performed using the 2021 CKD-EPI Study Refit equation without the race variable for the IDMS-Traceable creatinine methods.  https://jasn.asnjournals.org/content/early/2021/09/22/ASN.0365519604    Calcium 11/21/2023 9.3  8.6 - 10.6 mg/dL Final    Albumin 11/21/2023 4.4  3.4 - 5.0 g/dL Final    Alkaline Phosphatase 11/21/2023 79  33 - 136 U/L Final    Total Protein 11/21/2023 6.6  6.4 - 8.2 g/dL Final    AST 11/21/2023 18  9 - 39 U/L Final    Bilirubin, Total 11/21/2023 0.5  0.0 - 1.2 mg/dL Final    ALT 11/21/2023 16  7 - 45 U/L Final    Patients treated with Sulfasalazine may generate falsely decreased results for ALT.    Cholesterol 11/21/2023 175  0 - 199 mg/dL Final          Age      Desirable   Borderline High   High     0-19 Y     0 - 169       170 - 199     >/= 200    20-24 Y     0 - 189       190 - 224     >/= 225         >24 Y     0 - 199       200 - 239     >/= 240   **All ranges are based on fasting samples. Specific   therapeutic targets will vary based on patient-specific   cardiac risk.    Pediatric guidelines reference:Pediatrics 2011, 128(S5).Adult guidelines reference: NCEP ATPIII Guidelines,NICOLE 2001, 258:2486-97    Venipuncture immediately after or during the administration of  Metamizole may lead to falsely low results. Testing should be performed immediately prior to Metamizole dosing.    HDL-Cholesterol 11/21/2023 41.4  mg/dL Final      Age       Very Low   Low     Normal    High    0-19 Y    < 35      < 40     40-45     ----  20-24 Y    ----     < 40      >45      ----        >24 Y      ----     < 40     40-60      >60      Cholesterol/HDL Ratio 11/21/2023 4.2   Final      Ref Values  Desirable  < 3.4  High Risk  > 5.0    LDL Calculated 11/21/2023 88  <=99 mg/dL Final                                Near   Borderline      AGE      Desirable  Optimal    High     High     Very High     0-19 Y     0 - 109     ---    110-129   >/= 130     ----    20-24 Y     0 - 119     ---    120-159   >/= 160     ----      >24 Y     0 -  99   100-129  130-159   160-189     >/=190      VLDL 11/21/2023 46 (H)  0 - 40 mg/dL Final    Triglycerides 11/21/2023 228 (H)  0 - 149 mg/dL Final       Age         Desirable   Borderline High   High     Very High   0 D-90 D    19 - 174         ----         ----        ----  91 D- 9 Y     0 -  74        75 -  99     >/= 100      ----    10-19 Y     0 -  89        90 - 129     >/= 130      ----    20-24 Y     0 - 114       115 - 149     >/= 150      ----         >24 Y     0 - 149       150 - 199    200- 499    >/= 500    Venipuncture immediately after or during the administration of Metamizole may lead to falsely low results. Testing should be performed immediately prior to Metamizole dosing.    Non HDL Cholesterol 11/21/2023 134  0 - 149 mg/dL Final          Age       Desirable   Borderline High   High     Very High     0-19 Y     0 - 119       120 - 144     >/= 145    >/= 160    20-24 Y     0 - 149       150 - 189     >/= 190      ----         >24 Y    30 mg/dL above LDL Cholesterol goal      Thyroid Stimulating Hormone 11/21/2023 2.20  0.44 - 3.98 mIU/L Final     Current Outpatient Medications on File Prior to Visit   Medication Sig Dispense Refill    aspirin 81 mg EC  tablet Take 1 tablet (81 mg) by mouth once daily.      calcium citrate-vitamin D2 250 mg-2.5 mcg (100 unit) tablet Take 250 mg by mouth once daily.      DM/p-ephed/acetaminoph/doxylam (VICKS NYQUIL ORAL) Take by mouth.      levothyroxine (Synthroid, Levoxyl) 50 mcg tablet TAKE 1 AND 1/2 TABLETS DAILY BY MOUTH 135 tablet 1    vitamins A,C,E-zinc-copper (PreserVision AREDS) 2,148 mcg-113 mg-45 mg-17.4mg tablet Take 1 tablet by mouth once daily.      flaxseed oiL oil Take 1,000 mg by mouth once daily.      fluticasone (Flonase) 50 mcg/actuation nasal spray PLEASE SEE ATTACHED FOR DETAILED DIRECTIONS (Patient not taking: Reported on 7/29/2024) 48 mL 3    predniSONE (Deltasone) 5 mg tablet 10 then 9 then 8 then 7 etc. until gone (Patient not taking: Reported on 7/29/2024) 55 tablet 0     No current facility-administered medications on file prior to visit.     No images are attached to the encounter.            Assessment/Plan   Problem List Items Addressed This Visit             ICD-10-CM    Acute meniscal tear of right knee - Primary S83.206A     MRI of the right knee and referral to orthopedic specialist         Anxiety F41.9     Treating for anxiety.  Starting sertraline therapy.  Would like to follow-up in 2 weeks.    Recommend counseling as well.         Adjustment disorder F43.20

## 2024-07-29 NOTE — PATIENT INSTRUCTIONS
Starting medication to help with generalized anxiety.    Would like to know how you are doing with this and have follow-up in 2 weeks.    If any worsening of symptoms such as depression or increased anxiety please call and let me know.    Do recommend meeting with a counselor as well.    I would also like to have MRI of the knee performed and have you see orthopedic specialist

## 2024-07-29 NOTE — ASSESSMENT & PLAN NOTE
Treating for anxiety.  Starting sertraline therapy.  Would like to follow-up in 2 weeks.    Recommend counseling as well.

## 2024-08-15 ENCOUNTER — TELEPHONE (OUTPATIENT)
Dept: PRIMARY CARE | Facility: CLINIC | Age: 69
End: 2024-08-15
Payer: MEDICARE

## 2024-08-15 DIAGNOSIS — F41.9 ANXIETY: Primary | ICD-10-CM

## 2024-08-15 RX ORDER — DIAZEPAM 5 MG/1
5 TABLET ORAL EVERY 12 HOURS PRN
Qty: 2 TABLET | Refills: 0 | Status: SHIPPED | OUTPATIENT
Start: 2024-08-15

## 2024-08-20 DIAGNOSIS — F41.9 ANXIETY: ICD-10-CM

## 2024-08-20 RX ORDER — SERTRALINE HYDROCHLORIDE 50 MG/1
50 TABLET, FILM COATED ORAL DAILY
Qty: 90 TABLET | Refills: 1 | Status: SHIPPED | OUTPATIENT
Start: 2024-08-20

## 2024-08-22 ENCOUNTER — APPOINTMENT (OUTPATIENT)
Dept: PRIMARY CARE | Facility: CLINIC | Age: 69
End: 2024-08-22
Payer: MEDICARE

## 2024-08-23 ENCOUNTER — APPOINTMENT (OUTPATIENT)
Dept: RADIOLOGY | Facility: CLINIC | Age: 69
End: 2024-08-23
Payer: MEDICARE

## 2024-09-03 ENCOUNTER — HOSPITAL ENCOUNTER (OUTPATIENT)
Dept: RADIOLOGY | Facility: CLINIC | Age: 69
Discharge: HOME | End: 2024-09-03
Payer: MEDICARE

## 2024-09-03 DIAGNOSIS — S83.206D ACUTE MENISCAL TEAR OF RIGHT KNEE, SUBSEQUENT ENCOUNTER: ICD-10-CM

## 2024-09-03 PROCEDURE — 73721 MRI JNT OF LWR EXTRE W/O DYE: CPT | Mod: RIGHT SIDE | Performed by: RADIOLOGY

## 2024-09-03 PROCEDURE — 73721 MRI JNT OF LWR EXTRE W/O DYE: CPT | Mod: RT

## 2024-09-04 DIAGNOSIS — S83.206A ACUTE MENISCAL TEAR OF RIGHT KNEE, INITIAL ENCOUNTER: ICD-10-CM

## 2024-10-15 ENCOUNTER — TELEPHONE (OUTPATIENT)
Dept: PRIMARY CARE | Facility: CLINIC | Age: 69
End: 2024-10-15
Payer: MEDICARE

## 2024-10-15 DIAGNOSIS — E03.9 HYPOTHYROIDISM, UNSPECIFIED TYPE: ICD-10-CM

## 2024-11-07 DIAGNOSIS — E03.9 HYPOTHYROIDISM, UNSPECIFIED TYPE: ICD-10-CM

## 2024-11-14 ENCOUNTER — HOSPITAL ENCOUNTER (OUTPATIENT)
Dept: RADIOLOGY | Facility: CLINIC | Age: 69
Discharge: HOME | End: 2024-11-14
Payer: MEDICARE

## 2024-11-14 ENCOUNTER — OFFICE VISIT (OUTPATIENT)
Dept: PRIMARY CARE | Facility: CLINIC | Age: 69
End: 2024-11-14
Payer: MEDICARE

## 2024-11-14 VITALS
WEIGHT: 137 LBS | TEMPERATURE: 97.4 F | OXYGEN SATURATION: 94 % | BODY MASS INDEX: 27.67 KG/M2 | HEART RATE: 61 BPM | DIASTOLIC BLOOD PRESSURE: 75 MMHG | SYSTOLIC BLOOD PRESSURE: 121 MMHG

## 2024-11-14 DIAGNOSIS — M79.641 PAIN IN RIGHT HAND: Primary | ICD-10-CM

## 2024-11-14 DIAGNOSIS — M79.641 PAIN IN RIGHT HAND: ICD-10-CM

## 2024-11-14 PROCEDURE — 73130 X-RAY EXAM OF HAND: CPT | Mod: RT

## 2024-11-14 ASSESSMENT — COLUMBIA-SUICIDE SEVERITY RATING SCALE - C-SSRS
1. IN THE PAST MONTH, HAVE YOU WISHED YOU WERE DEAD OR WISHED YOU COULD GO TO SLEEP AND NOT WAKE UP?: NO
2. HAVE YOU ACTUALLY HAD ANY THOUGHTS OF KILLING YOURSELF?: NO
6. HAVE YOU EVER DONE ANYTHING, STARTED TO DO ANYTHING, OR PREPARED TO DO ANYTHING TO END YOUR LIFE?: NO

## 2024-11-14 ASSESSMENT — ENCOUNTER SYMPTOMS
LOSS OF SENSATION IN FEET: 0
OCCASIONAL FEELINGS OF UNSTEADINESS: 0
DEPRESSION: 0

## 2024-11-14 ASSESSMENT — PATIENT HEALTH QUESTIONNAIRE - PHQ9
SUM OF ALL RESPONSES TO PHQ9 QUESTIONS 1 AND 2: 0
2. FEELING DOWN, DEPRESSED OR HOPELESS: NOT AT ALL
10. IF YOU CHECKED OFF ANY PROBLEMS, HOW DIFFICULT HAVE THESE PROBLEMS MADE IT FOR YOU TO DO YOUR WORK, TAKE CARE OF THINGS AT HOME, OR GET ALONG WITH OTHER PEOPLE: NOT DIFFICULT AT ALL
1. LITTLE INTEREST OR PLEASURE IN DOING THINGS: NOT AT ALL

## 2024-11-14 NOTE — PROGRESS NOTES
Subjective   Patient ID: Maritza Young is a 69 y.o. female who presents for Fall (Right hand ).    HPI     On 10/28, she fell forward on her cement porch steps and landed on her right side. She did not hit her head. She caught herself with her right hand and her pinky extended back and she heard a pop. The pain has persisted and she has noticed some swelling. The pain is worse with gripping. She has good range of motion of her fingers and wrist. She has been taking Tylenol and applying ice at bedtime. She is right handed.     Of note, she did also land on her right hip and right knee but states these are not bothersome.     Review of Systems  ROS negative except as noted above in HPI.     Objective   /75   Pulse 61   Temp 36.3 °C (97.4 °F)   Wt 62.1 kg (137 lb)   SpO2 94%   BMI 27.67 kg/m²     Physical Exam  General: Alert and oriented, in no acute distress. Appears stated age, well-nourished, and well hydrated  HEENT:  - Head: Normocephalic and atraumatic   - Eyes: EOMI, PERRLA  - ENT: Hearing grossly intact  Heart: RRR. No murmurs, clicks, or rubs  Lungs: Unlabored breathing. CTAB with no crackles, wheezes, or rhonchi  Musculoskeletal: TTP and swelling over right 5th metatarsal. Normal gait and station  Neurological: Alert and oriented. No gross neurological deficits  Psychological: Appropriate mood and affect  Skin: No rash, abnormal lesions, cyanosis, or erythema    Assessment/Plan   Diagnoses and all orders for this visit:  Pain in right hand  -     XR hand right 3+ views; Future  -     Can take NSAIDs prn   -     Continue ice application  -     Recommend elevation    RTC if symptoms persist or worsen    COLLEEN Krishnamurthy-CNP  [facility]

## 2024-12-03 ENCOUNTER — LAB (OUTPATIENT)
Dept: LAB | Facility: LAB | Age: 69
End: 2024-12-03
Payer: MEDICARE

## 2024-12-03 DIAGNOSIS — E03.9 HYPOTHYROIDISM, UNSPECIFIED TYPE: ICD-10-CM

## 2024-12-03 LAB
ALBUMIN SERPL BCP-MCNC: 4.2 G/DL (ref 3.4–5)
ALP SERPL-CCNC: 80 U/L (ref 33–136)
ALT SERPL W P-5'-P-CCNC: 16 U/L (ref 7–45)
ANION GAP SERPL CALC-SCNC: 13 MMOL/L (ref 10–20)
AST SERPL W P-5'-P-CCNC: 20 U/L (ref 9–39)
BASOPHILS # BLD AUTO: 0.02 X10*3/UL (ref 0–0.1)
BASOPHILS NFR BLD AUTO: 0.4 %
BILIRUB SERPL-MCNC: 0.5 MG/DL (ref 0–1.2)
BUN SERPL-MCNC: 18 MG/DL (ref 6–23)
CALCIUM SERPL-MCNC: 9 MG/DL (ref 8.6–10.6)
CHLORIDE SERPL-SCNC: 103 MMOL/L (ref 98–107)
CHOLEST SERPL-MCNC: 185 MG/DL (ref 0–199)
CHOLESTEROL/HDL RATIO: 4.8
CO2 SERPL-SCNC: 28 MMOL/L (ref 21–32)
CREAT SERPL-MCNC: 0.72 MG/DL (ref 0.5–1.05)
EGFRCR SERPLBLD CKD-EPI 2021: >90 ML/MIN/1.73M*2
EOSINOPHIL # BLD AUTO: 0.17 X10*3/UL (ref 0–0.7)
EOSINOPHIL NFR BLD AUTO: 3.4 %
ERYTHROCYTE [DISTWIDTH] IN BLOOD BY AUTOMATED COUNT: 13.7 % (ref 11.5–14.5)
GLUCOSE SERPL-MCNC: 81 MG/DL (ref 74–99)
HCT VFR BLD AUTO: 41.5 % (ref 36–46)
HDLC SERPL-MCNC: 38.7 MG/DL
HGB BLD-MCNC: 13.7 G/DL (ref 12–16)
IMM GRANULOCYTES # BLD AUTO: 0.01 X10*3/UL (ref 0–0.7)
IMM GRANULOCYTES NFR BLD AUTO: 0.2 % (ref 0–0.9)
LDLC SERPL CALC-MCNC: 97 MG/DL
LYMPHOCYTES # BLD AUTO: 1.41 X10*3/UL (ref 1.2–4.8)
LYMPHOCYTES NFR BLD AUTO: 27.9 %
MCH RBC QN AUTO: 29.6 PG (ref 26–34)
MCHC RBC AUTO-ENTMCNC: 33 G/DL (ref 32–36)
MCV RBC AUTO: 90 FL (ref 80–100)
MONOCYTES # BLD AUTO: 0.33 X10*3/UL (ref 0.1–1)
MONOCYTES NFR BLD AUTO: 6.5 %
NEUTROPHILS # BLD AUTO: 3.11 X10*3/UL (ref 1.2–7.7)
NEUTROPHILS NFR BLD AUTO: 61.6 %
NON HDL CHOLESTEROL: 146 MG/DL (ref 0–149)
NRBC BLD-RTO: 0 /100 WBCS (ref 0–0)
PLATELET # BLD AUTO: 232 X10*3/UL (ref 150–450)
POTASSIUM SERPL-SCNC: 4.1 MMOL/L (ref 3.5–5.3)
PROT SERPL-MCNC: 6.5 G/DL (ref 6.4–8.2)
RBC # BLD AUTO: 4.63 X10*6/UL (ref 4–5.2)
SODIUM SERPL-SCNC: 140 MMOL/L (ref 136–145)
TRIGL SERPL-MCNC: 246 MG/DL (ref 0–149)
TSH SERPL-ACNC: 2.08 MIU/L (ref 0.44–3.98)
VLDL: 49 MG/DL (ref 0–40)
WBC # BLD AUTO: 5.1 X10*3/UL (ref 4.4–11.3)

## 2024-12-03 PROCEDURE — 36415 COLL VENOUS BLD VENIPUNCTURE: CPT

## 2024-12-03 PROCEDURE — 80053 COMPREHEN METABOLIC PANEL: CPT

## 2024-12-03 PROCEDURE — 85025 COMPLETE CBC W/AUTO DIFF WBC: CPT

## 2024-12-03 PROCEDURE — 84443 ASSAY THYROID STIM HORMONE: CPT

## 2024-12-03 PROCEDURE — 80061 LIPID PANEL: CPT

## 2024-12-06 ENCOUNTER — OFFICE VISIT (OUTPATIENT)
Dept: PRIMARY CARE | Facility: CLINIC | Age: 69
End: 2024-12-06
Payer: MEDICARE

## 2024-12-06 ENCOUNTER — APPOINTMENT (OUTPATIENT)
Dept: PRIMARY CARE | Facility: CLINIC | Age: 69
End: 2024-12-06
Payer: MEDICARE

## 2024-12-06 VITALS
WEIGHT: 134 LBS | DIASTOLIC BLOOD PRESSURE: 71 MMHG | HEIGHT: 60 IN | BODY MASS INDEX: 26.31 KG/M2 | HEART RATE: 59 BPM | OXYGEN SATURATION: 94 % | SYSTOLIC BLOOD PRESSURE: 127 MMHG | TEMPERATURE: 97.5 F

## 2024-12-06 DIAGNOSIS — Z00.00 ENCOUNTER FOR ANNUAL WELLNESS VISIT (AWV) IN MEDICARE PATIENT: Primary | ICD-10-CM

## 2024-12-06 DIAGNOSIS — R91.8 LUNG NODULES: ICD-10-CM

## 2024-12-06 DIAGNOSIS — F33.42 RECURRENT MAJOR DEPRESSIVE DISORDER, IN FULL REMISSION (CMS-HCC): ICD-10-CM

## 2024-12-06 DIAGNOSIS — E03.9 HYPOTHYROIDISM, UNSPECIFIED TYPE: ICD-10-CM

## 2024-12-06 DIAGNOSIS — Z78.0 POSTMENOPAUSAL: ICD-10-CM

## 2024-12-06 PROBLEM — J01.10 ACUTE NON-RECURRENT FRONTAL SINUSITIS: Status: RESOLVED | Noted: 2023-05-10 | Resolved: 2024-12-06

## 2024-12-06 PROBLEM — L91.8 SKIN TAG: Status: RESOLVED | Noted: 2023-12-01 | Resolved: 2024-12-06

## 2024-12-06 PROBLEM — Z28.21 REFUSED PNEUMOCOCCAL VACCINE: Status: RESOLVED | Noted: 2023-12-01 | Resolved: 2024-12-06

## 2024-12-06 PROCEDURE — 99497 ADVNCD CARE PLAN 30 MIN: CPT | Performed by: NURSE PRACTITIONER

## 2024-12-06 PROCEDURE — 1159F MED LIST DOCD IN RCRD: CPT | Performed by: NURSE PRACTITIONER

## 2024-12-06 PROCEDURE — 99214 OFFICE O/P EST MOD 30 MIN: CPT | Performed by: NURSE PRACTITIONER

## 2024-12-06 PROCEDURE — 3008F BODY MASS INDEX DOCD: CPT | Performed by: NURSE PRACTITIONER

## 2024-12-06 PROCEDURE — 1160F RVW MEDS BY RX/DR IN RCRD: CPT | Performed by: NURSE PRACTITIONER

## 2024-12-06 PROCEDURE — 1036F TOBACCO NON-USER: CPT | Performed by: NURSE PRACTITIONER

## 2024-12-06 PROCEDURE — 1170F FXNL STATUS ASSESSED: CPT | Performed by: NURSE PRACTITIONER

## 2024-12-06 PROCEDURE — G0439 PPPS, SUBSEQ VISIT: HCPCS | Performed by: NURSE PRACTITIONER

## 2024-12-06 ASSESSMENT — ACTIVITIES OF DAILY LIVING (ADL)
MANAGING_FINANCES: INDEPENDENT
DRESSING: INDEPENDENT
BATHING: INDEPENDENT
GROCERY_SHOPPING: INDEPENDENT
DOING_HOUSEWORK: INDEPENDENT
TAKING_MEDICATION: INDEPENDENT

## 2024-12-06 ASSESSMENT — ENCOUNTER SYMPTOMS
OCCASIONAL FEELINGS OF UNSTEADINESS: 0
DEPRESSION: 0
LOSS OF SENSATION IN FEET: 0

## 2024-12-06 ASSESSMENT — COLUMBIA-SUICIDE SEVERITY RATING SCALE - C-SSRS
1. IN THE PAST MONTH, HAVE YOU WISHED YOU WERE DEAD OR WISHED YOU COULD GO TO SLEEP AND NOT WAKE UP?: NO
6. HAVE YOU EVER DONE ANYTHING, STARTED TO DO ANYTHING, OR PREPARED TO DO ANYTHING TO END YOUR LIFE?: NO
2. HAVE YOU ACTUALLY HAD ANY THOUGHTS OF KILLING YOURSELF?: NO

## 2024-12-06 ASSESSMENT — PATIENT HEALTH QUESTIONNAIRE - PHQ9
SUM OF ALL RESPONSES TO PHQ9 QUESTIONS 1 AND 2: 0
10. IF YOU CHECKED OFF ANY PROBLEMS, HOW DIFFICULT HAVE THESE PROBLEMS MADE IT FOR YOU TO DO YOUR WORK, TAKE CARE OF THINGS AT HOME, OR GET ALONG WITH OTHER PEOPLE: NOT DIFFICULT AT ALL
1. LITTLE INTEREST OR PLEASURE IN DOING THINGS: NOT AT ALL
2. FEELING DOWN, DEPRESSED OR HOPELESS: NOT AT ALL

## 2024-12-06 NOTE — PROGRESS NOTES
Subjective   Reason for Visit: Maritza Young is an 69 y.o. female here for a Medicare Wellness visit.     Past Medical, Surgical, and Family History reviewed and updated in chart.    Reviewed all medications by prescribing practitioner or clinical pharmacist (such as prescriptions, OTCs, herbal therapies and supplements) and documented in the medical record.    HPI    Patient Care Team:  Abdirizak Khan DO as PCP - General  Abdirizak Khan DO as PCP - MSS[facility] ACO Attributed Provider     PHQ-2/9: 0  STEADI Fall Risk: Moderate falls risk  Home and Safety Risk Factors: None  Functional Ability/Level of Safety:   Cognitive Impairment Observed: No cognitive impairment observed  Cognitive Impairment Reported: No cognitive impairment reported by patient or family  General Health:   Patient Self Assessment: Good  Nutrition and Exercise:    Current Diet: Well Balanced Diet   Adequate Fluid Intake: Yes   Caffeine: Yes   Exercise Frequency: Infrequently  Activities of Daily Living Screening:    Hearing - Right Ear: Functional   Hearing - Left Ear: Functional   Bathing: Independent   Dressing: Independent   Walks in Home: Independent  Instrumental Activities of Daily Living Screening:    Managing Finances: Independent   Grocery Shopping: Independent   Taking Medication: Independent   Doing Housework: Independent      Review of Systems  Gen: denies fever, chills, weight loss, fatigue  HEENT: denies sinus pressure, sinus congestion, runny nose, red eyes, itchy eyes, vision loss, ear pain, hearing loss, throat pain, trouble swallowing  Neck: denies neck pain, neck swelling or masses  Chest/breast: denies breast pain, breast lumps, nipple discharge  CV: denies chest pain, palpitations, fast heart rate, syncope  Resp: denies shortness of breath, cough, wheezing  GI: denies abdominal pain, nausea, diarrhea, constipation, hematochezia, melena  : denies dysuria, hematuria, vaginal discharge, frequency  Endo: denies polydipsia,  polyuria, heat/cold intolerance, weight change, hair thinning  Heme: denies easy bruising, easy bleeding  Neuro: denies headache, numbness, tingling, memory loss, changes in vision  MSK: R wrist/hand pain but improving. Denies  joint swelling, weakness  Psych: denies suicidal ideation, homicidal ideation, depression, anxiety, mood swings  Skin: denies rashes, abnormal lesions, itching, changes in moles    Objective   Vitals:  /71   Pulse 59   Temp 36.4 °C (97.5 °F)   Ht 1.524 m (5')   Wt 60.8 kg (134 lb)   SpO2 94%   BMI 26.17 kg/m²       Physical Exam  General: Alert and oriented, in no acute distress. Appears stated age, well-nourished, and well hydrated  HEENT:  - Head: Normocephalic and atraumatic   - Eyes: EOMI, PERRLA  - ENT: Hearing grossly intact. Mucus membranes pink and moist without lesions. Tonsils present without swelling or exudates. Good dentition. TMs gray  Neck: Supple. No stiffness. No thyromegaly or thyroid nodules  Heart: RRR. No murmurs, clicks, or rubs  Lungs: Unlabored breathing. CTAB with no crackles, wheezes, or rhonchi  Abdomen: Normal BS in all 4 quadrants. Soft, non-tender, non-distended, with no masses  Extremities: Warm and well perfused. No edema. Normal peripheral pulses  Musculoskeletal: ROM intact. Strength 5/5 in BUE and BLE. No joint swelling. Normal gait and station  Neurological: Alert and oriented. No gross neurological deficits. Normal sensation. No weakness. DTRs +2/4   Psychological: Appropriate mood and affect  Skin: No rash, abnormal lesions, cyanosis, or erythema     Assessment & Plan  Encounter for annual wellness visit (AWV) in Medicare patient  - Recommend RSV and Prevnar vaccines; patient to get at the pharmacy  - DEXA scan ordered  - Mammogram up to date (done 6/20/2024)  - Colonoscopy up to date (done 11/1/2023 - due 2033)  - Advance Directives Discussion  16 - 20 minutes were spent discussing Advanced Care Planning (including a Living Will, Medical  Power Of , as well as specific end of life choices and/or directives). The details of that discussion were documented in Advanced Directives Discussion section of the medical record.     Lung nodules    Orders:    CT chest wo IV contrast; Future    Recurrent major depressive disorder, in full remission (CMS-HCC)  - Controlled with sertraline 50 mg every day     Hypothyroidism, unspecified type  - TSH 2.08  - Continue levothyroxine 75 mcg every day        COLLEEN Krishnamurthy-CNP  [facility]

## 2024-12-06 NOTE — ASSESSMENT & PLAN NOTE
- Recommend RSV and Prevnar vaccines; patient to get at the pharmacy  - DEXA scan ordered  - Mammogram up to date (done 6/20/2024)  - Colonoscopy up to date (done 11/1/2023 - due 2033)  - Advance Directives Discussion  16 - 20 minutes were spent discussing Advanced Care Planning (including a Living Will, Medical Power Of , as well as specific end of life choices and/or directives). The details of that discussion were documented in Advanced Directives Discussion section of the medical record.

## 2024-12-06 NOTE — ACP (ADVANCE CARE PLANNING)
Confirming Previous Code Status:   Advance Care Planning Note     Discussion Date: 12/06/24   Discussion Participants: patient    The patient wishes to discuss Advance Care Planning today and the following is a brief summary of our discussion.     Patient has capacity to make their own medical decisions: Yes  Health Care Agent/Surrogate Decision Maker documented in chart: No    Documents on file and valid:  Advance Directive/Living Will: No   Health Care Power of : No  Other:     Communication of Medical Status/Prognosis:   Good     Communication of Treatment Goals/Options:   Good     Treatment Decisions  Goals of Care: survival is prioritized, if goals for quality or survival can reasonably be achieved     Follow Up Plan  Stable  Team Members  Dr. Khan - PCP    Time Statement: Total face to face time spent on advance care planning was 6 minutes with 16 minutes spent in counseling, including the explanation.    COLLEEN Godinez-CNP  12/6/2024 10:38 AM

## 2024-12-06 NOTE — PATIENT INSTRUCTIONS
I recommend getting the RSV and prevnar 20 vaccines at the pharmacy.     I ordered a bone density scan and CT chest. To schedule these, call [phone]

## 2024-12-17 ENCOUNTER — HOSPITAL ENCOUNTER (OUTPATIENT)
Dept: RADIOLOGY | Facility: CLINIC | Age: 69
Discharge: HOME | End: 2024-12-17
Payer: MEDICARE

## 2024-12-17 DIAGNOSIS — Z78.0 POSTMENOPAUSAL: ICD-10-CM

## 2024-12-17 PROCEDURE — 77080 DXA BONE DENSITY AXIAL: CPT

## 2024-12-17 PROCEDURE — 77080 DXA BONE DENSITY AXIAL: CPT | Performed by: STUDENT IN AN ORGANIZED HEALTH CARE EDUCATION/TRAINING PROGRAM

## 2024-12-20 ENCOUNTER — HOSPITAL ENCOUNTER (OUTPATIENT)
Dept: RADIOLOGY | Facility: CLINIC | Age: 69
Discharge: HOME | End: 2024-12-20
Payer: MEDICARE

## 2024-12-20 DIAGNOSIS — R91.8 LUNG NODULES: ICD-10-CM

## 2024-12-20 PROCEDURE — 71250 CT THORAX DX C-: CPT

## 2025-02-24 DIAGNOSIS — E03.9 HYPOTHYROIDISM, UNSPECIFIED: ICD-10-CM

## 2025-02-24 RX ORDER — LEVOTHYROXINE SODIUM 50 UG/1
75 TABLET ORAL DAILY
Qty: 135 TABLET | Refills: 0 | Status: SHIPPED | OUTPATIENT
Start: 2025-02-24

## 2025-03-02 DIAGNOSIS — F41.9 ANXIETY: ICD-10-CM

## 2025-03-03 RX ORDER — SERTRALINE HYDROCHLORIDE 50 MG/1
50 TABLET, FILM COATED ORAL DAILY
Qty: 90 TABLET | Refills: 1 | Status: SHIPPED | OUTPATIENT
Start: 2025-03-03

## 2025-05-22 DIAGNOSIS — E03.9 HYPOTHYROIDISM, UNSPECIFIED: ICD-10-CM

## 2025-05-22 RX ORDER — LEVOTHYROXINE SODIUM 50 UG/1
75 TABLET ORAL DAILY
Qty: 135 TABLET | Refills: 0 | Status: SHIPPED | OUTPATIENT
Start: 2025-05-22

## 2025-06-20 DIAGNOSIS — Z12.31 SCREENING MAMMOGRAM FOR BREAST CANCER: ICD-10-CM

## 2025-08-14 DIAGNOSIS — R92.8 ABNORMAL MAMMOGRAM OF RIGHT BREAST: ICD-10-CM

## 2025-08-16 DIAGNOSIS — E03.9 HYPOTHYROIDISM, UNSPECIFIED: ICD-10-CM

## 2025-08-18 RX ORDER — LEVOTHYROXINE SODIUM 50 UG/1
75 TABLET ORAL DAILY
Qty: 135 TABLET | Refills: 0 | Status: SHIPPED | OUTPATIENT
Start: 2025-08-18

## 2025-08-28 DIAGNOSIS — F41.9 ANXIETY: ICD-10-CM

## 2025-08-28 RX ORDER — SERTRALINE HYDROCHLORIDE 50 MG/1
50 TABLET, FILM COATED ORAL DAILY
Qty: 90 TABLET | Refills: 1 | Status: SHIPPED | OUTPATIENT
Start: 2025-08-28